# Patient Record
Sex: FEMALE | Race: WHITE | NOT HISPANIC OR LATINO | Employment: UNEMPLOYED | URBAN - METROPOLITAN AREA
[De-identification: names, ages, dates, MRNs, and addresses within clinical notes are randomized per-mention and may not be internally consistent; named-entity substitution may affect disease eponyms.]

---

## 2022-03-15 ENCOUNTER — HOSPITAL ENCOUNTER (EMERGENCY)
Facility: CLINIC | Age: 27
Discharge: HOME | End: 2022-03-15
Attending: EMERGENCY MEDICINE | Admitting: EMERGENCY MEDICINE
Payer: COMMERCIAL

## 2022-03-15 VITALS
TEMPERATURE: 98.6 F | SYSTOLIC BLOOD PRESSURE: 121 MMHG | WEIGHT: 112.88 LBS | HEART RATE: 102 BPM | OXYGEN SATURATION: 98 % | RESPIRATION RATE: 20 BRPM | DIASTOLIC BLOOD PRESSURE: 78 MMHG

## 2022-03-15 DIAGNOSIS — F50.9 EATING DISORDER, UNSPECIFIED TYPE: ICD-10-CM

## 2022-03-15 LAB
ALBUMIN SERPL-MCNC: 3.7 G/DL (ref 3.4–5)
ALBUMIN UR-MCNC: NEGATIVE MG/DL
ALP SERPL-CCNC: 56 U/L (ref 40–150)
ALT SERPL W P-5'-P-CCNC: 22 U/L (ref 0–50)
AMORPH CRY #/AREA URNS HPF: ABNORMAL /HPF
ANION GAP SERPL CALCULATED.3IONS-SCNC: 6 MMOL/L (ref 3–14)
APPEARANCE UR: ABNORMAL
AST SERPL W P-5'-P-CCNC: 22 U/L (ref 0–45)
BACTERIA #/AREA URNS HPF: ABNORMAL /HPF
BASOPHILS # BLD AUTO: 0.1 10E3/UL (ref 0–0.2)
BASOPHILS NFR BLD AUTO: 1 %
BILIRUB SERPL-MCNC: 0.4 MG/DL (ref 0.2–1.3)
BILIRUB UR QL STRIP: NEGATIVE
BUN SERPL-MCNC: 8 MG/DL (ref 7–30)
CALCIUM SERPL-MCNC: 9.3 MG/DL (ref 8.5–10.1)
CHLORIDE BLD-SCNC: 107 MMOL/L (ref 94–109)
CO2 SERPL-SCNC: 26 MMOL/L (ref 20–32)
COLOR UR AUTO: YELLOW
CREAT SERPL-MCNC: 0.69 MG/DL (ref 0.52–1.04)
EOSINOPHIL # BLD AUTO: 0.1 10E3/UL (ref 0–0.7)
EOSINOPHIL NFR BLD AUTO: 1 %
ERYTHROCYTE [DISTWIDTH] IN BLOOD BY AUTOMATED COUNT: 13.7 % (ref 10–15)
GFR SERPL CREATININE-BSD FRML MDRD: >90 ML/MIN/1.73M2
GLUCOSE BLD-MCNC: 90 MG/DL (ref 70–99)
GLUCOSE UR STRIP-MCNC: NEGATIVE MG/DL
HCG UR QL: NEGATIVE
HCT VFR BLD AUTO: 37.6 % (ref 35–47)
HGB BLD-MCNC: 12.1 G/DL (ref 11.7–15.7)
HGB UR QL STRIP: NEGATIVE
HOLD SPECIMEN: NORMAL
IMM GRANULOCYTES # BLD: 0 10E3/UL
IMM GRANULOCYTES NFR BLD: 0 %
KETONES UR STRIP-MCNC: NEGATIVE MG/DL
LEUKOCYTE ESTERASE UR QL STRIP: ABNORMAL
LIPASE SERPL-CCNC: 159 U/L (ref 73–393)
LYMPHOCYTES # BLD AUTO: 2.4 10E3/UL (ref 0.8–5.3)
LYMPHOCYTES NFR BLD AUTO: 31 %
MCH RBC QN AUTO: 28.8 PG (ref 26.5–33)
MCHC RBC AUTO-ENTMCNC: 32.2 G/DL (ref 31.5–36.5)
MCV RBC AUTO: 90 FL (ref 78–100)
MONOCYTES # BLD AUTO: 0.4 10E3/UL (ref 0–1.3)
MONOCYTES NFR BLD AUTO: 5 %
MUCOUS THREADS #/AREA URNS LPF: PRESENT /LPF
NEUTROPHILS # BLD AUTO: 4.8 10E3/UL (ref 1.6–8.3)
NEUTROPHILS NFR BLD AUTO: 62 %
NITRATE UR QL: NEGATIVE
NRBC # BLD AUTO: 0 10E3/UL
NRBC BLD AUTO-RTO: 0 /100
PH UR STRIP: 8 [PH] (ref 5–7)
PLATELET # BLD AUTO: 314 10E3/UL (ref 150–450)
POTASSIUM BLD-SCNC: 4.3 MMOL/L (ref 3.4–5.3)
PROT SERPL-MCNC: 8.1 G/DL (ref 6.8–8.8)
RBC # BLD AUTO: 4.2 10E6/UL (ref 3.8–5.2)
RBC URINE: 2 /HPF
SODIUM SERPL-SCNC: 139 MMOL/L (ref 133–144)
SP GR UR STRIP: 1.01 (ref 1–1.03)
SQUAMOUS EPITHELIAL: 2 /HPF
UROBILINOGEN UR STRIP-MCNC: NORMAL MG/DL
WBC # BLD AUTO: 7.7 10E3/UL (ref 4–11)
WBC URINE: 3 /HPF

## 2022-03-15 PROCEDURE — 83690 ASSAY OF LIPASE: CPT | Performed by: EMERGENCY MEDICINE

## 2022-03-15 PROCEDURE — 81025 URINE PREGNANCY TEST: CPT | Performed by: EMERGENCY MEDICINE

## 2022-03-15 PROCEDURE — 36415 COLL VENOUS BLD VENIPUNCTURE: CPT | Performed by: EMERGENCY MEDICINE

## 2022-03-15 PROCEDURE — 90791 PSYCH DIAGNOSTIC EVALUATION: CPT

## 2022-03-15 PROCEDURE — 82040 ASSAY OF SERUM ALBUMIN: CPT | Performed by: EMERGENCY MEDICINE

## 2022-03-15 PROCEDURE — 93005 ELECTROCARDIOGRAM TRACING: CPT

## 2022-03-15 PROCEDURE — 81001 URINALYSIS AUTO W/SCOPE: CPT | Performed by: EMERGENCY MEDICINE

## 2022-03-15 PROCEDURE — 258N000003 HC RX IP 258 OP 636: Performed by: EMERGENCY MEDICINE

## 2022-03-15 PROCEDURE — 80053 COMPREHEN METABOLIC PANEL: CPT | Performed by: EMERGENCY MEDICINE

## 2022-03-15 PROCEDURE — 85025 COMPLETE CBC W/AUTO DIFF WBC: CPT | Performed by: EMERGENCY MEDICINE

## 2022-03-15 PROCEDURE — 99285 EMERGENCY DEPT VISIT HI MDM: CPT | Mod: 25

## 2022-03-15 RX ORDER — SODIUM CHLORIDE 9 MG/ML
INJECTION, SOLUTION INTRAVENOUS CONTINUOUS
Status: DISCONTINUED | OUTPATIENT
Start: 2022-03-15 | End: 2022-03-15 | Stop reason: HOSPADM

## 2022-03-15 RX ADMIN — SODIUM CHLORIDE 1000 ML: 9 INJECTION, SOLUTION INTRAVENOUS at 17:18

## 2022-03-15 ASSESSMENT — ENCOUNTER SYMPTOMS
BLOOD IN STOOL: 0
ABDOMINAL PAIN: 0
UNEXPECTED WEIGHT CHANGE: 0
TROUBLE SWALLOWING: 0
COUGH: 0
POLYDIPSIA: 0
VOMITING: 0
PALPITATIONS: 0
FEVER: 0
HYPERACTIVE: 0
DIARRHEA: 0
CHEST TIGHTNESS: 0
WEAKNESS: 0
HALLUCINATIONS: 1
SHORTNESS OF BREATH: 0
DYSPHORIC MOOD: 1
NERVOUS/ANXIOUS: 0
CHILLS: 0
DECREASED CONCENTRATION: 0
BACK PAIN: 0
HEADACHES: 0
ACTIVITY CHANGE: 0
FATIGUE: 0
DYSURIA: 0
NAUSEA: 0
POLYPHAGIA: 0
SLEEP DISTURBANCE: 0
APPETITE CHANGE: 1

## 2022-03-15 NOTE — ED PROVIDER NOTES
History     Chief Complaint:  No chief complaint on file.       HPI   Anna Valdovinos is a 26 year old female who presents with hx of depression and body image issues with eating swings and induced vomiting.  Here because last 2 weeks she feels her thoughts about food have been out of control as well body image thoughts.  Denies SI HI AH VH.  No blood or bile.  No abdominal pain.      ROS:  Review of Systems   Constitutional: Positive for appetite change. Negative for activity change, chills, fatigue, fever and unexpected weight change.   HENT: Negative for trouble swallowing.    Respiratory: Negative for cough, chest tightness and shortness of breath.    Cardiovascular: Negative for chest pain, palpitations and leg swelling.   Gastrointestinal: Negative for abdominal pain, blood in stool, diarrhea, nausea and vomiting.   Endocrine: Negative for cold intolerance, heat intolerance, polydipsia, polyphagia and polyuria.   Genitourinary: Negative for dysuria.   Musculoskeletal: Negative for back pain.   Neurological: Negative for weakness and headaches.   Psychiatric/Behavioral: Positive for dysphoric mood and hallucinations. Negative for behavioral problems, decreased concentration, self-injury, sleep disturbance and suicidal ideas. The patient is not nervous/anxious and is not hyperactive.    All other systems reviewed and are negative.        Allergies:  Cefprozil  Latex     Medications:    No current outpatient medications on file.      Past Medical History:    No past medical history on file.  There is no problem list on file for this patient.       Past Surgical History:    No past surgical history on file.     Family History:    family history is not on file.    Social History:     PCP: No Ref-Primary, Physician     Physical Exam     Patient Vitals for the past 24 hrs:   BP Temp Temp src Pulse Resp SpO2 Weight   03/15/22 1622 -- -- -- -- -- -- 51.2 kg (112 lb 14 oz)   03/15/22 1618 133/80 -- -- -- -- -- --    03/15/22 1617 -- 98.6  F (37  C) Temporal 115 20 97 % --        Physical Exam  Constitutional: Patient is well appearing. No distress.  Head: Atraumatic.  Mouth/Throat: Oropharynx is clear and moist. No oropharyngeal exudate.  Eyes: Conjunctivae and EOM are normal. No scleral icterus.  Neck: Normal range of motion. Neck supple.   Cardiovascular: Normal rate, regular rhythm, normal heart sounds and intact distal pulses.   Pulmonary/Chest: Breath sounds normal. No respiratory distress.  Abdominal: Soft. Bowel sounds are normal. No distension. No tenderness. No rebound or guarding.   Musculoskeletal: Normal range of motion. No edema or tenderness.   Neurological: Alert and orientated to person, place, and time. No observable focal neuro deficit  Skin: Warm and dry. No rash noted. Not diaphoretic.     Emergency Department Course   ECG:        Laboratory:  Labs Ordered and Resulted from Time of ED Arrival to Time of ED Departure - No data to display     Procedures       Emergency Department Course:    Mental Health Risk Assessment      PSS-3    Date and Time Over the past 2 weeks have you felt down, depressed, or hopeless? Over the past 2 weeks have you had thoughts of killing yourself? Have you ever attempted to kill yourself? When did this last happen? User   03/15/22 1621 yes no yes more than 6 months ago EED                  Reviewed:  I reviewed nursing notes, vitals and past medical history    Assessments:   I obtained history and examined the patient as noted above.    I rechecked the patient and explained findings.       Consults:   DEC    Interventions:  Medications   0.9% sodium chloride BOLUS (1,000 mLs Intravenous New Bag 3/15/22 9642)     Followed by   sodium chloride 0.9% infusion (has no administration in time range)        Disposition:  The patient was discharged to home.     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  Mixed type sx  of eating disorder.  Here CBC CMP and lipase shows no metabolic or GI derangement.  DEC saw pt please see separate note.  Safe for dc and strict return and F/u given.          Diagnosis:    ICD-10-CM    1. Eating disorder, unspecified type  F50.9         Discharge Medications:  New Prescriptions    No medications on file        3/15/2022   Jonathan Mijares MD Stevens, Andrew C, MD  03/15/22 2107

## 2022-03-15 NOTE — ED PROVIDER NOTES
"  History   Chief Complaint:  No chief complaint on file.       HPI   Anna Valdovinos is a 26 year old female with history of *** who presents with ***.    Review of Systems  ***    Allergies:  Cefprozil  Latex  Cinnamon  Mushroom  Shellfix    Medications:  Fluoxetine  Vistaril   Epi-pen     Past Medical History:     Severe recurrent major depression  Anxiety     Past Surgical History:    The patient has no pertinent surgical history.     Family History:    The patient has no known family history.    Social History:  The patient presents to the ED ***. She has no assigned PCP.       Physical Exam     Patient Vitals for the past 24 hrs:   BP Temp Temp src Pulse Resp SpO2 Weight   03/15/22 1622 -- -- -- -- -- -- 51.2 kg (112 lb 14 oz)   03/15/22 1618 133/80 -- -- -- -- -- --   03/15/22 1617 -- 98.6  F (37  C) Temporal 115 20 97 % --       Physical Exam  ***    Emergency Department Course   ECG  ECG obtained at ***, ECG read at ***  ***   *** as compared to prior, dated ***/***/***.  Rate *** bpm. CO interval *** ms. QRS duration *** ms. QT/QTc *** ms. P-R-T axes *** *** ***.     Imaging:  No orders to display     Report per {read:625007}    Laboratory:  Labs Ordered and Resulted from Time of ED Arrival to Time of ED Departure - No data to display     Procedures  ***    Emergency Department Course:    Mental Health Risk Assessment      PSS-3    Date and Time Over the past 2 weeks have you felt down, depressed, or hopeless? Over the past 2 weeks have you had thoughts of killing yourself? Have you ever attempted to kill yourself? When did this last happen? User   03/15/22 1621 yes no yes more than 6 months ago EED          {TIP (no need to delete Tips)  Reminder - a positive PSS3 should have the \"suicide risk asessment completion\" dotphrase completed. Please add this and complete it if it not automatically listed. :[id number]}    {Suicide Assessment Completion:864901}    Reviewed:  I reviewed " "{EDreview:626742}    Assessments:  *** I obtained history and examined the patient as noted above.   *** I rechecked the patient*** and explained findings***.   ***    Consults:  ***    Interventions:  Medications   0.9% sodium chloride BOLUS (1,000 mLs Intravenous New Bag 3/15/22 1237)     Followed by   sodium chloride 0.9% infusion (has no administration in time range)       Disposition:  {EPPAFV Dispo:411453}    Impression & Plan     CMS Diagnoses: {Sepsis/Septic Shock/Stemi/Stroke:342045::\"None\"}  {FVTrauma:739357}    Medical Decision Making:  ***     Critical Care Time: was *** minutes for this patient excluding procedures    Diagnosis:  No diagnosis found.    Discharge Medications:  New Prescriptions    No medications on file       Scribe Disclosure:  I, Ivon Villeda, am serving as a scribe at 5:34 PM on 3/15/2022 to document services personally performed by Jonathan Mijares MD based on my observations and the provider's statements to me.           "

## 2022-03-15 NOTE — ED TRIAGE NOTES
Pt presents to ED with dizziness and nauseated. States she has been dealing with potential eating disorder. States many days she eats nothing. Other days its very little. Has rashaad dealing with issues with eating since high school. Has not been diagnosed with any eating disorders. Feels nauseated when she does eat. Thinks about trying to make herself vomit, but states she has only done so one time. Feels like skin is more pale than normal. C/o upper abdominal discomfort.

## 2022-03-15 NOTE — LETTER
Anna Valdovinos was seen and treated in our emergency department on 3/15/2022.  She may return to work on 03/16/22      If you have any questions or concerns, please don't hesitate to call.      Janette GERMAN RN

## 2022-03-16 LAB
ATRIAL RATE - MUSE: 88 BPM
DIASTOLIC BLOOD PRESSURE - MUSE: NORMAL MMHG
INTERPRETATION ECG - MUSE: NORMAL
P AXIS - MUSE: 76 DEGREES
PR INTERVAL - MUSE: 120 MS
QRS DURATION - MUSE: 72 MS
QT - MUSE: 338 MS
QTC - MUSE: 408 MS
R AXIS - MUSE: 64 DEGREES
SYSTOLIC BLOOD PRESSURE - MUSE: NORMAL MMHG
T AXIS - MUSE: 40 DEGREES
VENTRICULAR RATE- MUSE: 88 BPM

## 2022-03-16 NOTE — DISCHARGE INSTRUCTIONS
DEC Aftercare Plan  If I am feeling unsafe or I am in a crisis, I will:   Contact my established care providers   Call the National Suicide Prevention Lifeline: [phone]   Go to the nearest emergency room   Call 911     Warning signs that I or other people might notice when a crisis is developing for me:   Thoughts of physical self harm   Increased difficulty eating/maintaing appetite     Things I am able to do on my own to cope or help me feel better:   Follow daily routine for structure and accountability  Utilize distraction methods/grounding techinques     Things that I am able to do with others to cope or help me feel better:   Ask for help as needed  Spend time with trusted indvididual     Changes I can make to support my mental health and wellness:   Follow through with individual therapy  Consider [facility] case management for additional supports and resources     People in my life that I can ask for help:   [facility] crisis workers  Outpatient providers     Your [facility] has a mental health crisis team you can call 24/7: [facility]  [phone]        Additional resources and information:     *therapy appointment details; virtual appointment  Date: Thursday, 3/17/2022  Time: 9:00 am - 10:00 am  Provider: Henna Mendez  Location: [facility], [address] [place] [address]  Phone: [phone]  Type: Teletherapy  Patient Instructions  For all appointments: you will be sent information to fill out the intake paperwork online. Please fill the paperwork prior to your appointment. For telehealth appointments: you will also be sent a zoom link to attend the appointment remotely.      Other [facility] resources;  [facility]      [phone]     24/7 Crisis Response, [phone]     Adult Mental Health, [phone]     Aging and Disability, Services [phone]     Chemical Health, [phone]     Child Protection Reporting, [phone]     Children and Family Services Intake,  [phone]     [facility] Crisis Line, [phone]     Vulnerable Adult Reporting, [phone]       - coping skills for anxiety:  Stop and Breathe     When anxiety flares, take a time out and think about what it is that is making you so nervous. Anxiety is typically experienced as worrying about a future or past event.     For example, you may be worried that something bad is going to happen in the future. Perhaps you continually feel upset over an event that has already occurred. Regardless of what you are worried about, a big part of the problem is that you are not being mindful of the present moment.     Anxiety loses its  when you clear your mind of worry and bring your awareness back to the present.     The next time your anxiety starts to take you out of the present, regain control by sitting down and taking a few deep breaths. Simply stopping and breathing can help restore a sense of personal balance and bring you back to the present moment. However, if you have the time, try taking this activity a little further and experiment with a breathing exercise and mantra.          Figure Out What's Bothering You     The physical symptoms of panic and anxiety, such as trembling, chest pain, and rapid heartbeat, are usually more apparent than understanding just what is making you anxious. However, in order to get to the root of your anxiety, you need to figure out what s bothering you. To get to the bottom of your anxiety, put some time aside to exploring your thoughts and feelings.     Writing in a journal can be a great way to get in touch with your sources of anxiety. If anxious feelings seem to be keeping you up at night, try keeping a journal or notepad next to your bed. Write down all of the things that are bothering you. Talking with a friend can be another way to discover and understand your anxious feelings.1?     Make it a habit to regularly uncover and express your feelings of anxiety.           Focus On What You Can Change     Many times anxiety stems from fearing things that haven t even happened and may never occur. For example, even though everything is okay, you may still worry about potential issues, such as losing your job, becoming ill, or the safety of your loved ones.     Life can be unpredictable and no matter how hard you try, you can t always control what happens. However, you can decide how you are going to deal with the unknown. You can turn your anxiety into a source of strength by letting go of fear and focusing on gratitude.          Replace your fears by changing your attitude about them. For example, stop fearing to lose your job and instead focus on how grateful you are to have a job. Come to work determined to do your best. Instead of fearing your loved one's safety, spend time with them, or express your appreciation of them. With a little practice, you can learn to dump your anxiety and  a more positive outlook.     At times, your anxiety may actually be caused by a real circumstance in your life. Perhaps you re in a situation where it is realistic to be worried about losing your job due to high company layoffs or talks of downsizing.     When anxiety is identified as being caused by a current problem, then taking action may be the answer to reducing your anxiety.4? For example, you may need to start job searching or scheduling interviews after work.     By being more proactive, you can feel like you have a bit more control over your situation.          Focus on Something Less Anxiety-Provoking     At times, it may be most helpful to simply redirect yourself to focus on something other than your anxiety.6? You may want to reach out to others, do some work around your home, or engage in an enjoyable activity or hobby. Here are a few ideas of things you can do to thwart off anxiety:     Do some chores or organizing around the house.     Engage in a creative activity, such as  "drawing, painting, or writing.     Go for a walk or engage in some other form of physical exercise.     Listen to music.     [place] or meditate.     Read a good book or watch a funny movie.     Most people are familiar with experiencing some anxiety from time-to-time. However, chronic anxiety can be a sign of a diagnosable anxiety disorder.     When anxiety affects one s relationships, work performance, and other areas of life, there is potential that these anxious feelings are actually an indication of mental health illness.     If you are experiencing anxiety and panic symptoms, talk with your doctor or other professionals who treat panic disorder. They will be able to address any concerns you have, provide information on diagnosis, and discuss your treatment options.     Crisis Lines  Crisis Text Line  Text 169119  You will be connected with a trained live crisis counselor to provide support.    Por farida, texto  LIZ a 692895 o texto a 442-AYUDAME en WhatsApp    National Hope Line  1.800.SUICIDE [7015221]      Community Resources  Fast Tracker  Linking people to mental health and substance use disorder resources  fasttrackermn.org     [place] [facility] Warm Line  Peer to peer support  Monday thru Saturday, 12 pm to 10 pm  [phone] or [phone]  Text \"Support\" to 44783    National [place] [employer] Mental Illness (RUPA)  853.094.0306 or 1.888.RUPA.HELPS        Mental Health Apps  My3  https://my3225 filmspp.org/    VirtualHopeBox  https://Deluux.org/apps/virtual-hope-box/  "
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
## 2022-04-12 ENCOUNTER — APPOINTMENT (OUTPATIENT)
Dept: ULTRASOUND IMAGING | Facility: CLINIC | Age: 27
End: 2022-04-12
Attending: EMERGENCY MEDICINE
Payer: COMMERCIAL

## 2022-04-12 ENCOUNTER — HOSPITAL ENCOUNTER (EMERGENCY)
Facility: CLINIC | Age: 27
Discharge: HOME | End: 2022-04-12
Attending: EMERGENCY MEDICINE | Admitting: EMERGENCY MEDICINE
Payer: COMMERCIAL

## 2022-04-12 VITALS
WEIGHT: 119.05 LBS | OXYGEN SATURATION: 99 % | HEART RATE: 86 BPM | TEMPERATURE: 97.6 F | RESPIRATION RATE: 16 BRPM | SYSTOLIC BLOOD PRESSURE: 107 MMHG | DIASTOLIC BLOOD PRESSURE: 76 MMHG

## 2022-04-12 DIAGNOSIS — N93.9 VAGINAL BLEEDING: ICD-10-CM

## 2022-04-12 LAB
ALBUMIN UR-MCNC: 100 MG/DL
ANION GAP SERPL CALCULATED.3IONS-SCNC: 5 MMOL/L (ref 3–14)
APPEARANCE UR: ABNORMAL
BASOPHILS # BLD AUTO: 0.1 10E3/UL (ref 0–0.2)
BASOPHILS NFR BLD AUTO: 1 %
BILIRUB UR QL STRIP: NEGATIVE
BUN SERPL-MCNC: 10 MG/DL (ref 7–30)
CALCIUM SERPL-MCNC: 9.1 MG/DL (ref 8.5–10.1)
CHLORIDE BLD-SCNC: 107 MMOL/L (ref 94–109)
CO2 SERPL-SCNC: 25 MMOL/L (ref 20–32)
COLOR UR AUTO: ABNORMAL
CREAT SERPL-MCNC: 0.59 MG/DL (ref 0.52–1.04)
EOSINOPHIL # BLD AUTO: 0.1 10E3/UL (ref 0–0.7)
EOSINOPHIL NFR BLD AUTO: 1 %
ERYTHROCYTE [DISTWIDTH] IN BLOOD BY AUTOMATED COUNT: 13.6 % (ref 10–15)
GFR SERPL CREATININE-BSD FRML MDRD: >90 ML/MIN/1.73M2
GLUCOSE BLD-MCNC: 86 MG/DL (ref 70–99)
GLUCOSE UR STRIP-MCNC: NEGATIVE MG/DL
HCG SER QL IA.RAPID: NEGATIVE
HCG UR QL: NEGATIVE
HCT VFR BLD AUTO: 39.7 % (ref 35–47)
HGB BLD-MCNC: 12.6 G/DL (ref 11.7–15.7)
HGB UR QL STRIP: ABNORMAL
HOLD SPECIMEN: NORMAL
IMM GRANULOCYTES # BLD: 0 10E3/UL
IMM GRANULOCYTES NFR BLD: 0 %
KETONES UR STRIP-MCNC: NEGATIVE MG/DL
LEUKOCYTE ESTERASE UR QL STRIP: ABNORMAL
LYMPHOCYTES # BLD AUTO: 2.4 10E3/UL (ref 0.8–5.3)
LYMPHOCYTES NFR BLD AUTO: 38 %
MCH RBC QN AUTO: 28.1 PG (ref 26.5–33)
MCHC RBC AUTO-ENTMCNC: 31.7 G/DL (ref 31.5–36.5)
MCV RBC AUTO: 88 FL (ref 78–100)
MONOCYTES # BLD AUTO: 0.4 10E3/UL (ref 0–1.3)
MONOCYTES NFR BLD AUTO: 6 %
NEUTROPHILS # BLD AUTO: 3.4 10E3/UL (ref 1.6–8.3)
NEUTROPHILS NFR BLD AUTO: 54 %
NITRATE UR QL: NEGATIVE
NRBC # BLD AUTO: 0 10E3/UL
NRBC BLD AUTO-RTO: 0 /100
PH UR STRIP: 7.5 [PH] (ref 5–7)
PLATELET # BLD AUTO: 347 10E3/UL (ref 150–450)
POTASSIUM BLD-SCNC: 3.9 MMOL/L (ref 3.4–5.3)
RBC # BLD AUTO: 4.49 10E6/UL (ref 3.8–5.2)
RBC URINE: >182 /HPF
SODIUM SERPL-SCNC: 137 MMOL/L (ref 133–144)
SP GR UR STRIP: 1.01 (ref 1–1.03)
SQUAMOUS EPITHELIAL: 12 /HPF
UROBILINOGEN UR STRIP-MCNC: NORMAL MG/DL
WBC # BLD AUTO: 6.4 10E3/UL (ref 4–11)
WBC URINE: 55 /HPF

## 2022-04-12 PROCEDURE — 84702 CHORIONIC GONADOTROPIN TEST: CPT

## 2022-04-12 PROCEDURE — 258N000003 HC RX IP 258 OP 636: Performed by: EMERGENCY MEDICINE

## 2022-04-12 PROCEDURE — 80048 BASIC METABOLIC PNL TOTAL CA: CPT | Performed by: EMERGENCY MEDICINE

## 2022-04-12 PROCEDURE — 87086 URINE CULTURE/COLONY COUNT: CPT | Performed by: EMERGENCY MEDICINE

## 2022-04-12 PROCEDURE — 96360 HYDRATION IV INFUSION INIT: CPT

## 2022-04-12 PROCEDURE — 93976 VASCULAR STUDY: CPT

## 2022-04-12 PROCEDURE — 85025 COMPLETE CBC W/AUTO DIFF WBC: CPT | Performed by: EMERGENCY MEDICINE

## 2022-04-12 PROCEDURE — 81025 URINE PREGNANCY TEST: CPT | Performed by: EMERGENCY MEDICINE

## 2022-04-12 PROCEDURE — 36415 COLL VENOUS BLD VENIPUNCTURE: CPT | Performed by: EMERGENCY MEDICINE

## 2022-04-12 PROCEDURE — 99285 EMERGENCY DEPT VISIT HI MDM: CPT | Mod: 25

## 2022-04-12 PROCEDURE — 81003 URINALYSIS AUTO W/O SCOPE: CPT | Performed by: EMERGENCY MEDICINE

## 2022-04-12 RX ORDER — SODIUM CHLORIDE 9 MG/ML
INJECTION, SOLUTION INTRAVENOUS CONTINUOUS
Status: DISCONTINUED | OUTPATIENT
Start: 2022-04-12 | End: 2022-04-12 | Stop reason: HOSPADM

## 2022-04-12 RX ADMIN — SODIUM CHLORIDE 1000 ML: 9 INJECTION, SOLUTION INTRAVENOUS at 14:59

## 2022-04-12 ASSESSMENT — ENCOUNTER SYMPTOMS
FEVER: 0
ABDOMINAL PAIN: 1
VOMITING: 0
DYSURIA: 1
SORE THROAT: 0
FLANK PAIN: 0
NAUSEA: 0
HEADACHES: 0
RHINORRHEA: 0
CHILLS: 0
DIARRHEA: 0
COUGH: 0
BLOOD IN STOOL: 0
SHORTNESS OF BREATH: 0
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
## 2022-04-12 NOTE — ED TRIAGE NOTES
Pt presents for evaluation of heavy vaginal bleeding with clots. She started having spotting yesterday evening, but by 1000 today she noticed the bleeding had increased significantly.   States she's on her fifth pad since noon.  Takes birth control and states her last period was two weeks ago, so bleeding now would be very abnormal for her.

## 2022-04-13 NOTE — RESULT ENCOUNTER NOTE
[facility] Emergency Dept discharge antibiotic (if prescribed): None  No changes in treatment per [facility] ED Lab Result Urine culture protocol.

## 2022-04-14 LAB — BACTERIA UR CULT: NORMAL

## 2022-09-26 ENCOUNTER — OFFICE VISIT (OUTPATIENT)
Dept: URGENT CARE | Facility: URGENT CARE | Age: 27
End: 2022-09-26
Payer: OTHER MISCELLANEOUS

## 2022-09-26 VITALS
DIASTOLIC BLOOD PRESSURE: 75 MMHG | OXYGEN SATURATION: 99 % | WEIGHT: 115 LBS | HEART RATE: 85 BPM | TEMPERATURE: 98.5 F | SYSTOLIC BLOOD PRESSURE: 123 MMHG

## 2022-09-26 DIAGNOSIS — S06.0X0A CONCUSSION WITHOUT LOSS OF CONSCIOUSNESS, INITIAL ENCOUNTER: Primary | ICD-10-CM

## 2022-09-26 PROCEDURE — 99203 OFFICE O/P NEW LOW 30 MIN: CPT
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
## 2023-11-28 ENCOUNTER — APPOINTMENT (OUTPATIENT)
Dept: ULTRASOUND IMAGING | Facility: CLINIC | Age: 28
End: 2023-11-28
Attending: EMERGENCY MEDICINE
Payer: COMMERCIAL

## 2023-11-28 ENCOUNTER — HOSPITAL ENCOUNTER (EMERGENCY)
Facility: CLINIC | Age: 28
Discharge: HOME | End: 2023-11-28
Attending: EMERGENCY MEDICINE | Admitting: EMERGENCY MEDICINE
Payer: COMMERCIAL

## 2023-11-28 VITALS
HEIGHT: 61 IN | OXYGEN SATURATION: 100 % | DIASTOLIC BLOOD PRESSURE: 71 MMHG | SYSTOLIC BLOOD PRESSURE: 113 MMHG | RESPIRATION RATE: 18 BRPM | HEART RATE: 79 BPM | TEMPERATURE: 97.6 F | WEIGHT: 124.3 LBS | BODY MASS INDEX: 23.47 KG/M2

## 2023-11-28 DIAGNOSIS — R10.13 EPIGASTRIC PAIN: ICD-10-CM

## 2023-11-28 DIAGNOSIS — R11.2 NAUSEA AND VOMITING, UNSPECIFIED VOMITING TYPE: ICD-10-CM

## 2023-11-28 LAB
ALBUMIN SERPL BCG-MCNC: 4.4 G/DL (ref 3.5–5.2)
ALP SERPL-CCNC: 73 U/L (ref 40–150)
ALT SERPL W P-5'-P-CCNC: 11 U/L (ref 0–50)
ANION GAP SERPL CALCULATED.3IONS-SCNC: 13 MMOL/L (ref 7–15)
AST SERPL W P-5'-P-CCNC: 15 U/L (ref 0–45)
BASOPHILS # BLD AUTO: 0.1 10E3/UL (ref 0–0.2)
BASOPHILS NFR BLD AUTO: 1 %
BILIRUB SERPL-MCNC: <0.2 MG/DL
BUN SERPL-MCNC: 7.8 MG/DL (ref 6–20)
CALCIUM SERPL-MCNC: 9.3 MG/DL (ref 8.6–10)
CHLORIDE SERPL-SCNC: 105 MMOL/L (ref 98–107)
CREAT SERPL-MCNC: 0.62 MG/DL (ref 0.51–0.95)
DEPRECATED HCO3 PLAS-SCNC: 22 MMOL/L (ref 22–29)
EGFRCR SERPLBLD CKD-EPI 2021: >90 ML/MIN/1.73M2
EOSINOPHIL # BLD AUTO: 0.3 10E3/UL (ref 0–0.7)
EOSINOPHIL NFR BLD AUTO: 3 %
ERYTHROCYTE [DISTWIDTH] IN BLOOD BY AUTOMATED COUNT: 14.3 % (ref 10–15)
GLUCOSE SERPL-MCNC: 101 MG/DL (ref 70–99)
HCG SERPL QL: NEGATIVE
HCT VFR BLD AUTO: 39.4 % (ref 35–47)
HGB BLD-MCNC: 13 G/DL (ref 11.7–15.7)
HOLD SPECIMEN: NORMAL
IMM GRANULOCYTES # BLD: 0 10E3/UL
IMM GRANULOCYTES NFR BLD: 0 %
LIPASE SERPL-CCNC: 47 U/L (ref 13–60)
LYMPHOCYTES # BLD AUTO: 4 10E3/UL (ref 0.8–5.3)
LYMPHOCYTES NFR BLD AUTO: 42 %
MCH RBC QN AUTO: 28 PG (ref 26.5–33)
MCHC RBC AUTO-ENTMCNC: 33 G/DL (ref 31.5–36.5)
MCV RBC AUTO: 85 FL (ref 78–100)
MONOCYTES # BLD AUTO: 0.5 10E3/UL (ref 0–1.3)
MONOCYTES NFR BLD AUTO: 6 %
NEUTROPHILS # BLD AUTO: 4.6 10E3/UL (ref 1.6–8.3)
NEUTROPHILS NFR BLD AUTO: 48 %
NRBC # BLD AUTO: 0 10E3/UL
NRBC BLD AUTO-RTO: 0 /100
PLATELET # BLD AUTO: 365 10E3/UL (ref 150–450)
POTASSIUM SERPL-SCNC: 3.9 MMOL/L (ref 3.4–5.3)
PROT SERPL-MCNC: 8 G/DL (ref 6.4–8.3)
RBC # BLD AUTO: 4.64 10E6/UL (ref 3.8–5.2)
SODIUM SERPL-SCNC: 140 MMOL/L (ref 135–145)
WBC # BLD AUTO: 9.4 10E3/UL (ref 4–11)

## 2023-11-28 PROCEDURE — 96376 TX/PRO/DX INJ SAME DRUG ADON: CPT

## 2023-11-28 PROCEDURE — 85025 COMPLETE CBC W/AUTO DIFF WBC: CPT | Performed by: EMERGENCY MEDICINE

## 2023-11-28 PROCEDURE — 258N000003 HC RX IP 258 OP 636: Performed by: EMERGENCY MEDICINE

## 2023-11-28 PROCEDURE — 250N000011 HC RX IP 250 OP 636: Mod: JZ | Performed by: EMERGENCY MEDICINE

## 2023-11-28 PROCEDURE — 80053 COMPREHEN METABOLIC PANEL: CPT | Performed by: EMERGENCY MEDICINE

## 2023-11-28 PROCEDURE — 84703 CHORIONIC GONADOTROPIN ASSAY: CPT | Performed by: EMERGENCY MEDICINE

## 2023-11-28 PROCEDURE — 250N000013 HC RX MED GY IP 250 OP 250 PS 637: Performed by: EMERGENCY MEDICINE

## 2023-11-28 PROCEDURE — 76705 ECHO EXAM OF ABDOMEN: CPT

## 2023-11-28 PROCEDURE — 36415 COLL VENOUS BLD VENIPUNCTURE: CPT | Performed by: EMERGENCY MEDICINE

## 2023-11-28 PROCEDURE — 99285 EMERGENCY DEPT VISIT HI MDM: CPT | Mod: 25

## 2023-11-28 PROCEDURE — 96374 THER/PROPH/DIAG INJ IV PUSH: CPT

## 2023-11-28 PROCEDURE — 83690 ASSAY OF LIPASE: CPT | Performed by: EMERGENCY MEDICINE

## 2023-11-28 PROCEDURE — 96375 TX/PRO/DX INJ NEW DRUG ADDON: CPT

## 2023-11-28 PROCEDURE — 96361 HYDRATE IV INFUSION ADD-ON: CPT

## 2023-11-28 RX ORDER — ONDANSETRON 2 MG/ML
4 INJECTION INTRAMUSCULAR; INTRAVENOUS ONCE
Status: COMPLETED | OUTPATIENT
Start: 2023-11-28 | End: 2023-11-28

## 2023-11-28 RX ORDER — KETOROLAC TROMETHAMINE 15 MG/ML
15 INJECTION, SOLUTION INTRAMUSCULAR; INTRAVENOUS ONCE
Status: COMPLETED | OUTPATIENT
Start: 2023-11-28 | End: 2023-11-28

## 2023-11-28 RX ORDER — ONDANSETRON 4 MG/1
4 TABLET, ORALLY DISINTEGRATING ORAL EVERY 8 HOURS PRN
Qty: 10 TABLET | Refills: 0 | Status: SHIPPED | OUTPATIENT
Start: 2023-11-28 | End: 2023-12-01

## 2023-11-28 RX ORDER — MAGNESIUM HYDROXIDE/ALUMINUM HYDROXICE/SIMETHICONE 120; 1200; 1200 MG/30ML; MG/30ML; MG/30ML
15 SUSPENSION ORAL ONCE
Status: COMPLETED | OUTPATIENT
Start: 2023-11-28 | End: 2023-11-28

## 2023-11-28 RX ORDER — HYDROCODONE BITARTRATE AND ACETAMINOPHEN 5; 325 MG/1; MG/1
1 TABLET ORAL ONCE
Status: COMPLETED | OUTPATIENT
Start: 2023-11-28 | End: 2023-11-28

## 2023-11-28 RX ADMIN — ONDANSETRON 4 MG: 2 INJECTION INTRAMUSCULAR; INTRAVENOUS at 06:04

## 2023-11-28 RX ADMIN — HYDROCODONE BITARTRATE AND ACETAMINOPHEN 1 TABLET: 5; 325 TABLET ORAL at 07:49

## 2023-11-28 RX ADMIN — ONDANSETRON 4 MG: 2 INJECTION INTRAMUSCULAR; INTRAVENOUS at 00:39

## 2023-11-28 RX ADMIN — SODIUM CHLORIDE 1000 ML: 9 INJECTION, SOLUTION INTRAVENOUS at 00:37

## 2023-11-28 RX ADMIN — ALUMINUM HYDROXIDE, MAGNESIUM HYDROXIDE, AND SIMETHICONE 15 ML: 200; 200; 20 SUSPENSION ORAL at 06:16

## 2023-11-28 RX ADMIN — KETOROLAC TROMETHAMINE 15 MG: 15 INJECTION, SOLUTION INTRAMUSCULAR; INTRAVENOUS at 02:29

## 2023-11-28 ASSESSMENT — ACTIVITIES OF DAILY LIVING (ADL)
ADLS_ACUITY_SCORE: 35

## 2023-11-28 NOTE — LETTER
November 28, 2023      To Whom It May Concern:      Anna Valdovinos was seen in our Emergency Department today, 11/28/23.  I expect her condition to improve over the next 1-2 days.  She may return to work/school when improved.    Sincerely,        JESUS Kelley

## 2023-11-28 NOTE — ED PROVIDER NOTES
"  History     Chief Complaint:  Abdominal Pain       The history is provided by the patient.      Anna Valdovinos is a 27 year old female who presents with RUQ abdominal pain beginning at 3 pm and coming in waves since. She states \"it feels like my stomach is being torn to shreds.\" Pain radiates into her right lower back. She reports nausea and diarrhea x20 since pain began. Reports some dry heaving but no vomiting. Patient reports she has had similar pain periodically for some time now, roughly twice a year. Pain lasts a few days and resolves on its own. Pain does not typically align with her menstrual period, though she does note she is due for her period today. She has been seen in the emergency department for this issue in the past and workup has always been negative. Patient did not take any medications for pain at home because she worried she would vomit them up. She took her birth control and allergy medications normally this morning. No history of abdominal surgery including cholecystectomy.     Independent Historian:   None - Patient Only    Review of External Notes:   Reviewed ED notes from 6/3/23, 10/2/23, 10/29/23, and 11/02/23.    Medications:    Epipen  Cymbalta  Minipress  Albuterol inhaler    Past Medical History:    Allergic rhinitis  Asthma   Depression   Anxiety   OCD    Past Surgical History:    Tympanostomy  [place] teeth extraction      Physical Exam   Patient Vitals for the past 24 hrs:   BP Temp Temp src Pulse Resp SpO2 Height Weight   11/28/23 0303 -- -- -- -- -- 100 % -- --   11/28/23 0232 -- -- -- -- -- 100 % -- --   11/28/23 0231 121/80 -- -- 75 -- -- -- --   11/28/23 0022 (!) 144/83 97.6  F (36.4  C) Temporal 91 18 98 % 1.543 m (5' 0.75\") 56.4 kg (124 lb 4.8 oz)        Physical Exam  General: Resting on the gurney, appears generally uncomfortable  Head:  The scalp, face, and head appear normal  Mouth/Throat: Mucus membranes are moist  CV:  Regular rate    Normal S1 and S2  No " pathological murmur   Resp:  Breath sounds clear and equal bilaterally    Non-labored, no retractions or accessory muscle use    No coarseness    No wheezing   GI:  Abdomen is soft, no rigidity  RUQ tenderness to palpation. No guarding no rebound.  MS:  Normal motor assessment of all extremities.    Good capillary refill noted.  Skin:  No rash or lesions noted.  Neuro:   Speech is normal and fluent. No apparent deficit.  Psych: Awake. Alert.  Normal affect.      Appropriate interactions.    Emergency Department Course   Imaging:  Abdomen US, limited (RUQ only)   Final Result   IMPRESSION:   1.  No sonographic findings to explain patient's symptoms of right upper quadrant abdominal pain.         Report per radiology    Laboratory:  Labs Ordered and Resulted from Time of ED Arrival to Time of ED Departure   COMPREHENSIVE METABOLIC PANEL - Abnormal       Result Value    Sodium 140      Potassium 3.9      Carbon Dioxide (CO2) 22      Anion Gap 13      Urea Nitrogen 7.8      Creatinine 0.62      GFR Estimate >90      Calcium 9.3      Chloride 105      Glucose 101 (*)     Alkaline Phosphatase 73      AST 15      ALT 11      Protein Total 8.0      Albumin 4.4      Bilirubin Total <0.2     HCG QUALITATIVE PREGNANCY - Normal    hCG Serum Qualitative Negative     LIPASE - Normal    Lipase 47     CBC WITH PLATELETS AND DIFFERENTIAL    WBC Count 9.4      RBC Count 4.64      Hemoglobin 13.0      Hematocrit 39.4      MCV 85      MCH 28.0      MCHC 33.0      RDW 14.3      Platelet Count 365      % Neutrophils 48      % Lymphocytes 42      % Monocytes 6      % Eosinophils 3      % Basophils 1      % Immature Granulocytes 0      NRBCs per 100 WBC 0      Absolute Neutrophils 4.6      Absolute Lymphocytes 4.0      Absolute Monocytes 0.5      Absolute Eosinophils 0.3      Absolute Basophils 0.1      Absolute Immature Granulocytes 0.0      Absolute NRBCs 0.0        Emergency Department Course & Assessments:        Interventions:  Medications   ondansetron (ZOFRAN) injection 4 mg (4 mg Intravenous $Given 11/28/23 0039)   sodium chloride 0.9% BOLUS 1,000 mL (0 mLs Intravenous Stopped 11/28/23 0332)   ketorolac (TORADOL) injection 15 mg (15 mg Intravenous $Given 11/28/23 0229)   ondansetron (ZOFRAN) injection 4 mg (4 mg Intravenous $Given 11/28/23 0604)        Independent Interpretation (X-rays, CTs, rhythm strip):  None    Assessments/Consultations/Discussion of Management or Tests:  ED Course as of 11/28/23 0607   Tue Nov 28, 2023   0206 I obtained the history and examined the patient as noted above.        Social Determinants of Health affecting care:   None    Disposition:  The patient was discharged to home.     Impression & Plan    Medical Decision Making:  Anna Valdovinos is a 27 year old female who presents with abdominal pain and diarrhea.  She looks overall well and without a concerning etiology of abdominal pain.  A broad differential diagnosis was of course considered.   The workup in the ED is at this point negative.  No etiology for the patients pain is found at this point and my suspicion of an intraabdominal catastrophe or other worrisome etiology is very low.  I considered obtaining CT imaging, however, she has of multiple similar episodes in the past without etiology found.  Additionally primary complaint is cramping and diarrhea as well as nausea and vomiting.  In the setting of vomiting and diarrhea acute surgical pathology is somewhat less likely.  She has no focal abdominal tenderness other than mild right upper quadrant tenderness.  No guarding.  No rebound.  Again this argues against surgical pathology.  US and lab work are reassuring.    I will not therefore admit her for serial exams and further workup.  Patient is hemodynamically stable in ED. Plan is home with abdominal pain recheck by primary care physician or return to ED at anytime.  As this has been ongoing, I recommended she follow-up with a  gastroenterologist.  Contact information for Dr. Lora was provided.  Return for fevers greater than 102, increasing pain, other new symptoms develop.  Abdominal pain handout given.  Questions were answered.     Diagnosis:    ICD-10-CM    1. Epigastric pain  R10.13       2. Nausea and vomiting, unspecified vomiting type  R11.2            Discharge Medications:  Discharge Medication List as of 11/28/2023  7:42 AM        START taking these medications    Details   omeprazole (PRILOSEC) 20 MG DR capsule Take 1 capsule (20 mg) by mouth daily for 30 days, Disp-30 capsule, R-0, E-Prescribe      ondansetron (ZOFRAN ODT) 4 MG ODT tab Take 1 tablet (4 mg) by mouth every 8 hours as needed for nausea, Disp-10 tablet, R-0, E-Prescribe            Scribe Disclosure:  I, Britney Bourne, am serving as a scribe at 2:01 AM on 11/28/2023 to document services personally performed by Jennifer Sandhu MD based on my observations and the provider's statements to me.     11/28/2023   Jennifer Sandhu MD Taxman, Karah M, MD  11/28/23 3876

## 2023-11-28 NOTE — ED TRIAGE NOTES
Pt reports onset of generalized abdominal pain that began about 3pm today and is getting worse. Reports 1 episode of vomiting and approx 10 loose stools.      Triage Assessment (Adult)       Row Name 11/28/23 0023          Triage Assessment    Airway WDL WDL        Respiratory WDL    Respiratory WDL WDL        Skin Circulation/Temperature WDL    Skin Circulation/Temperature WDL WDL        Cardiac WDL    Cardiac WDL WDL        Peripheral/Neurovascular WDL    Peripheral Neurovascular WDL WDL        Cognitive/Neuro/Behavioral WDL    Cognitive/Neuro/Behavioral WDL WDL

## 2023-11-28 NOTE — ED NOTES
Pt received education regarding controlled medication administration in emergency department. Pt verbalizes understanding that they are unable to drive or operate heavy machinery after receiving any controlled medications. Pt's SO driving pt home.

## 2024-02-18 ENCOUNTER — APPOINTMENT (OUTPATIENT)
Dept: CT IMAGING | Facility: CLINIC | Age: 29
End: 2024-02-18
Attending: EMERGENCY MEDICINE
Payer: COMMERCIAL

## 2024-02-18 ENCOUNTER — HOSPITAL ENCOUNTER (EMERGENCY)
Facility: CLINIC | Age: 29
Discharge: HOME | End: 2024-02-18
Attending: EMERGENCY MEDICINE | Admitting: EMERGENCY MEDICINE
Payer: COMMERCIAL

## 2024-02-18 VITALS
SYSTOLIC BLOOD PRESSURE: 123 MMHG | HEART RATE: 87 BPM | OXYGEN SATURATION: 98 % | BODY MASS INDEX: 23.03 KG/M2 | DIASTOLIC BLOOD PRESSURE: 80 MMHG | TEMPERATURE: 98.6 F | HEIGHT: 61 IN | RESPIRATION RATE: 18 BRPM | WEIGHT: 122 LBS

## 2024-02-18 DIAGNOSIS — R19.7 NAUSEA VOMITING AND DIARRHEA: ICD-10-CM

## 2024-02-18 DIAGNOSIS — R11.2 NAUSEA VOMITING AND DIARRHEA: ICD-10-CM

## 2024-02-18 DIAGNOSIS — R10.30 LOWER ABDOMINAL PAIN: ICD-10-CM

## 2024-02-18 LAB
ALBUMIN SERPL BCG-MCNC: 3.9 G/DL (ref 3.5–5.2)
ALBUMIN UR-MCNC: NEGATIVE MG/DL
ALP SERPL-CCNC: 67 U/L (ref 40–150)
ALT SERPL W P-5'-P-CCNC: 9 U/L (ref 0–50)
AMORPH CRY #/AREA URNS HPF: ABNORMAL /HPF
ANION GAP SERPL CALCULATED.3IONS-SCNC: 12 MMOL/L (ref 7–15)
APPEARANCE UR: CLEAR
AST SERPL W P-5'-P-CCNC: 18 U/L (ref 0–45)
BACTERIA #/AREA URNS HPF: ABNORMAL /HPF
BASOPHILS # BLD AUTO: 0 10E3/UL (ref 0–0.2)
BASOPHILS NFR BLD AUTO: 0 %
BILIRUB SERPL-MCNC: 1 MG/DL
BILIRUB UR QL STRIP: NEGATIVE
BUN SERPL-MCNC: 8.4 MG/DL (ref 6–20)
CALCIUM SERPL-MCNC: 8.5 MG/DL (ref 8.6–10)
CHLORIDE SERPL-SCNC: 105 MMOL/L (ref 98–107)
COLOR UR AUTO: ABNORMAL
CREAT SERPL-MCNC: 0.62 MG/DL (ref 0.51–0.95)
DEPRECATED HCO3 PLAS-SCNC: 24 MMOL/L (ref 22–29)
EGFRCR SERPLBLD CKD-EPI 2021: >90 ML/MIN/1.73M2
EOSINOPHIL # BLD AUTO: 0.5 10E3/UL (ref 0–0.7)
EOSINOPHIL NFR BLD AUTO: 4 %
ERYTHROCYTE [DISTWIDTH] IN BLOOD BY AUTOMATED COUNT: 13.5 % (ref 10–15)
GLUCOSE SERPL-MCNC: 103 MG/DL (ref 70–99)
GLUCOSE UR STRIP-MCNC: NEGATIVE MG/DL
HCG SERPL QL: NEGATIVE
HCT VFR BLD AUTO: 39.7 % (ref 35–47)
HGB BLD-MCNC: 12.9 G/DL (ref 11.7–15.7)
HGB UR QL STRIP: ABNORMAL
IMM GRANULOCYTES # BLD: 0.1 10E3/UL
IMM GRANULOCYTES NFR BLD: 0 %
KETONES UR STRIP-MCNC: NEGATIVE MG/DL
LEUKOCYTE ESTERASE UR QL STRIP: NEGATIVE
LIPASE SERPL-CCNC: 33 U/L (ref 13–60)
LYMPHOCYTES # BLD AUTO: 2.7 10E3/UL (ref 0.8–5.3)
LYMPHOCYTES NFR BLD AUTO: 19 %
MCH RBC QN AUTO: 27.8 PG (ref 26.5–33)
MCHC RBC AUTO-ENTMCNC: 32.5 G/DL (ref 31.5–36.5)
MCV RBC AUTO: 86 FL (ref 78–100)
MONOCYTES # BLD AUTO: 0.7 10E3/UL (ref 0–1.3)
MONOCYTES NFR BLD AUTO: 5 %
MUCOUS THREADS #/AREA URNS LPF: PRESENT /LPF
NEUTROPHILS # BLD AUTO: 10.5 10E3/UL (ref 1.6–8.3)
NEUTROPHILS NFR BLD AUTO: 72 %
NITRATE UR QL: NEGATIVE
NRBC # BLD AUTO: 0 10E3/UL
NRBC BLD AUTO-RTO: 0 /100
PH UR STRIP: 7.5 [PH] (ref 5–7)
PLATELET # BLD AUTO: 367 10E3/UL (ref 150–450)
POTASSIUM SERPL-SCNC: 3.7 MMOL/L (ref 3.4–5.3)
PROT SERPL-MCNC: 7.2 G/DL (ref 6.4–8.3)
RBC # BLD AUTO: 4.64 10E6/UL (ref 3.8–5.2)
RBC URINE: 3 /HPF
SODIUM SERPL-SCNC: 141 MMOL/L (ref 135–145)
SP GR UR STRIP: 1.01 (ref 1–1.03)
SQUAMOUS EPITHELIAL: 3 /HPF
UROBILINOGEN UR STRIP-MCNC: NORMAL MG/DL
WBC # BLD AUTO: 14.4 10E3/UL (ref 4–11)
WBC URINE: 3 /HPF

## 2024-02-18 PROCEDURE — 36415 COLL VENOUS BLD VENIPUNCTURE: CPT | Performed by: EMERGENCY MEDICINE

## 2024-02-18 PROCEDURE — 99285 EMERGENCY DEPT VISIT HI MDM: CPT | Mod: 25

## 2024-02-18 PROCEDURE — 250N000011 HC RX IP 250 OP 636: Performed by: EMERGENCY MEDICINE

## 2024-02-18 PROCEDURE — 74177 CT ABD & PELVIS W/CONTRAST: CPT

## 2024-02-18 PROCEDURE — 258N000003 HC RX IP 258 OP 636: Performed by: EMERGENCY MEDICINE

## 2024-02-18 PROCEDURE — 84703 CHORIONIC GONADOTROPIN ASSAY: CPT | Performed by: EMERGENCY MEDICINE

## 2024-02-18 PROCEDURE — 96361 HYDRATE IV INFUSION ADD-ON: CPT

## 2024-02-18 PROCEDURE — 96375 TX/PRO/DX INJ NEW DRUG ADDON: CPT

## 2024-02-18 PROCEDURE — 96374 THER/PROPH/DIAG INJ IV PUSH: CPT | Mod: 59

## 2024-02-18 PROCEDURE — 250N000009 HC RX 250: Performed by: EMERGENCY MEDICINE

## 2024-02-18 PROCEDURE — 80053 COMPREHEN METABOLIC PANEL: CPT | Performed by: EMERGENCY MEDICINE

## 2024-02-18 PROCEDURE — 83690 ASSAY OF LIPASE: CPT | Performed by: EMERGENCY MEDICINE

## 2024-02-18 PROCEDURE — 81001 URINALYSIS AUTO W/SCOPE: CPT | Performed by: EMERGENCY MEDICINE

## 2024-02-18 PROCEDURE — 85025 COMPLETE CBC W/AUTO DIFF WBC: CPT | Performed by: EMERGENCY MEDICINE

## 2024-02-18 RX ORDER — ONDANSETRON 4 MG/1
4 TABLET, ORALLY DISINTEGRATING ORAL EVERY 6 HOURS PRN
Qty: 10 TABLET | Refills: 0 | Status: SHIPPED | OUTPATIENT
Start: 2024-02-18 | End: 2024-08-18

## 2024-02-18 RX ORDER — HYDROMORPHONE HYDROCHLORIDE 1 MG/ML
0.5 INJECTION, SOLUTION INTRAMUSCULAR; INTRAVENOUS; SUBCUTANEOUS ONCE
Status: COMPLETED | OUTPATIENT
Start: 2024-02-18 | End: 2024-02-18

## 2024-02-18 RX ORDER — ONDANSETRON 2 MG/ML
4 INJECTION INTRAMUSCULAR; INTRAVENOUS EVERY 30 MIN PRN
Status: DISCONTINUED | OUTPATIENT
Start: 2024-02-18 | End: 2024-02-18 | Stop reason: HOSPADM

## 2024-02-18 RX ORDER — IOPAMIDOL 755 MG/ML
61 INJECTION, SOLUTION INTRAVASCULAR ONCE
Status: COMPLETED | OUTPATIENT
Start: 2024-02-18 | End: 2024-02-18

## 2024-02-18 RX ORDER — KETOROLAC TROMETHAMINE 15 MG/ML
15 INJECTION, SOLUTION INTRAMUSCULAR; INTRAVENOUS ONCE
Status: COMPLETED | OUTPATIENT
Start: 2024-02-18 | End: 2024-02-18

## 2024-02-18 RX ADMIN — ONDANSETRON 4 MG: 2 INJECTION INTRAMUSCULAR; INTRAVENOUS at 07:10

## 2024-02-18 RX ADMIN — HYDROMORPHONE HYDROCHLORIDE 0.5 MG: 1 INJECTION, SOLUTION INTRAMUSCULAR; INTRAVENOUS; SUBCUTANEOUS at 10:15

## 2024-02-18 RX ADMIN — SODIUM CHLORIDE 1000 ML: 9 INJECTION, SOLUTION INTRAVENOUS at 07:05

## 2024-02-18 RX ADMIN — KETOROLAC TROMETHAMINE 15 MG: 15 INJECTION, SOLUTION INTRAMUSCULAR; INTRAVENOUS at 07:09

## 2024-02-18 RX ADMIN — SODIUM CHLORIDE 60 ML: 9 INJECTION, SOLUTION INTRAVENOUS at 11:18

## 2024-02-18 RX ADMIN — IOPAMIDOL 61 ML: 755 INJECTION, SOLUTION INTRAVENOUS at 11:18

## 2024-02-18 ASSESSMENT — ACTIVITIES OF DAILY LIVING (ADL)
ADLS_ACUITY_SCORE: 35

## 2024-02-18 NOTE — ED NOTES
Patient is tearful and crying in pain.  Pain medications ordered one time. Tons of pain in the right lower quad, patient still has her appendix.

## 2024-02-18 NOTE — DISCHARGE INSTRUCTIONS
Return to the emergency department or seek medical care as instructed if your symptoms fail to improve or significantly worsen.    Take Acetaminophen (aka Tylenol) and/or ibuprofen as needed for symptom/pain relief; use as directed.    Take zofran as need for nausea; use as directed.    Take Imodium as needed for diarrhea    Follow-up as indicated on page 1.  Maintain adequate hydration and get plenty of rest.

## 2024-02-18 NOTE — ED PROVIDER NOTES
"  History   Chief Complaint:  Abdominal Pain and Nausea, Vomiting, & Diarrhea     HPI   Anna Valdovinos is a 28 year old female who presents with abdominal pain, nausea, vomiting, and diarrhea. The patient started to have abdominal pain 5 days ago. Her period started 5 days ago. She denies any period related abnormality with timing, length, or flow. The abdominal pain is severe and constant. She describes lower bilateral quadrant abdominal pain. She has had 5-6 episodes of vomiting since Tuesday (5 days ago). She is having about 8-9 episodes of diarrhea daily (nonbloody). She denies any urinary symptoms.  No significant cough, chest pain, or shortness of breath.  She denies fever. She is sexually active, but denies STD or pregnancy concern. She denies recent antibiotic use, recent travel or sick contacts. She was given Zofran 4 mg with EMS and fentanyl 50 mcg. She rates the pain to be a 7.5/10 currently.    Independent Historian:    None    Review of External Notes:  N/A    Medications:    No known medications    Past Medical History:    Endometriosis   Heart murmur  Asthma    Past Surgical History:    Ear tube placement    Physical Exam   Patient Vitals for the past 24 hrs:   BP Temp Temp src Pulse Resp SpO2 Height Weight   02/18/24 1404 123/80 -- -- 87 -- 98 % -- --   02/18/24 0900 -- -- -- -- -- 98 % -- --   02/18/24 0738 -- -- -- -- -- 98 % -- --   02/18/24 0700 -- -- -- -- -- 99 % -- --   02/18/24 0624 118/78 -- -- 79 18 -- 1.549 m (5' 1\") 55.3 kg (122 lb)   02/18/24 0610 -- 98.6  F (37  C) Oral -- 16 100 % -- --   02/18/24 0606 123/78 -- -- 80 -- -- -- --   Physical Exam    General: Alert and cooperative with exam. Patient in mild distress. Normal mentation.  Head:  Scalp is NC/AT  Eyes:  No scleral icterus, PERRL  ENT:  The external nose and ears are normal. The oropharynx is normal and without erythema; mucus membranes are moist. Uvula midline, no evidence of deep space infection.  Neck:  Normal range of " motion without rigidity.  CV:  Regular rate and rhythm    No pathologic murmur   Resp:  Breath sounds are clear bilaterally    Non-labored, no retractions or accessory muscle use  GI:  Abdomen is soft, no distension. Mild diffuse tenderness, greatest to the lower quadrants. No peritoneal signs  MS:  No lower extremity edema   Skin:  Warm and dry, No rash or lesions noted.  Neuro: Oriented x 3. No gross motor deficits.  Emergency Department Course   Laboratory:  Labs Ordered and Resulted from Time of ED Arrival to Time of ED Departure   COMPREHENSIVE METABOLIC PANEL - Abnormal       Result Value    Sodium 141      Potassium 3.7      Carbon Dioxide (CO2) 24      Anion Gap 12      Urea Nitrogen 8.4      Creatinine 0.62      GFR Estimate >90      Calcium 8.5 (*)     Chloride 105      Glucose 103 (*)     Alkaline Phosphatase 67      AST 18      ALT 9      Protein Total 7.2      Albumin 3.9      Bilirubin Total 1.0     ROUTINE UA WITH MICROSCOPIC - Abnormal    Color Urine Straw      Appearance Urine Clear      Glucose Urine Negative      Bilirubin Urine Negative      Ketones Urine Negative      Specific Gravity Urine 1.009      Blood Urine Large (*)     pH Urine 7.5 (*)     Protein Albumin Urine Negative      Urobilinogen Urine Normal      Nitrite Urine Negative      Leukocyte Esterase Urine Negative      Bacteria Urine Few (*)     Mucus Urine Present (*)     Amorphous Crystals Urine Few (*)     RBC Urine 3 (*)     WBC Urine 3      Squamous Epithelials Urine 3 (*)    CBC WITH PLATELETS AND DIFFERENTIAL - Abnormal    WBC Count 14.4 (*)     RBC Count 4.64      Hemoglobin 12.9      Hematocrit 39.7      MCV 86      MCH 27.8      MCHC 32.5      RDW 13.5      Platelet Count 367      % Neutrophils 72      % Lymphocytes 19      % Monocytes 5      % Eosinophils 4      % Basophils 0      % Immature Granulocytes 0      NRBCs per 100 WBC 0      Absolute Neutrophils 10.5 (*)     Absolute Lymphocytes 2.7      Absolute Monocytes 0.7       Absolute Eosinophils 0.5      Absolute Basophils 0.0      Absolute Immature Granulocytes 0.1      Absolute NRBCs 0.0     LIPASE - Normal    Lipase 33     HCG QUALITATIVE PREGNANCY - Normal    hCG Serum Qualitative Negative        Emergency Department Course & Assessments:    Interventions:  Medications   sodium chloride 0.9% BOLUS 1,000 mL (0 mLs Intravenous Stopped 2/18/24 0828)   ketorolac (TORADOL) injection 15 mg (15 mg Intravenous $Given 2/18/24 0709)   HYDROmorphone (PF) (DILAUDID) injection 0.5 mg (0.5 mg Intravenous $Given 2/18/24 1015)   iopamidol (ISOVUE-370) solution 61 mL (61 mLs Intravenous $Given 2/18/24 1118)   Saline (60 mLs As instructed $Given 2/18/24 1118)      Assessments:  0642 I obtained history and examined patient.   1046 I rechecked the patient and explained findings.  1355 I rechecked the patient.    Independent Interpretation (X-rays, CTs, rhythm strip):  None    Consultations/Discussion of Management or Tests:  None       Social Determinants of Health affecting care:  None     Disposition:  The patient was discharged to home.   Impression & Plan    Medical Decision Making:    Anna Valdovinos is a 28-year-old female who presents with abdominal pain.  The differential diagnosis is broad and includes:  Appendicitis, cholecystitis, peptic ulcer disease, diverticulitis, bowel obstruction, ischemia, pancreatitis, ovarian pathology, endometriosis, PID, amongst others.  Based on clinical exam, laboratory testing, and imaging, no significant etiologies were found.  Patient's pregnancy test is negative.  CT abdomen pelvis without acute findings.  Low clinical suspicion for ovarian torsion, PID, or other emergent etiology to patient's pain.  She has noted similar pain in the past related to menstrual cycles/endometriosis; symptoms may also possibly be related to viral syndrome.  Patient notes significant improvement in pain with interventions as above.  Recommended continued supportive care.   Patient tolerating oral intake in the ED and did not have any significant episodes of vomiting or diarrhea.  Recommended continuous Imodium for diarrhea as needed.  No indication for stool studies.  Provided prescription for Zofran.  Close follow-up with PCP if symptoms not improving return precautions discussed.  Patient discharged home.    Diagnosis:    ICD-10-CM    1. Lower abdominal pain  R10.30       2. Nausea vomiting and diarrhea  R11.2     R19.7            Discharge Medications:  Discharge Medication List as of 2/18/2024  2:03 PM        START taking these medications    Details   ondansetron (ZOFRAN ODT) 4 MG ODT tab Take 1 tablet (4 mg) by mouth every 6 hours as needed for nausea or vomiting, Disp-10 tablet, R-0, E-Prescribe            Scribe Disclosure:  Antionette BERRY, am serving as a scribe at 6:49 AM on 2/18/2024 to document services personally performed by Ivan Fountain DO based on my observations and the provider's statements to me.  2/18/2024   Ivan Fountain DO O'Neill, Christopher Warren, DO  02/18/24 2124

## 2024-02-18 NOTE — ED TRIAGE NOTES
BIBA from home. Per EMS pt c/o abdominal pain, N/V/D since Tuesday. /79 HR 80, BG 91. IV 20 G placed. Zofran 4 mg, Fentanyl 50 mcg  and Normal Saline 400cc given.  Hx endometriosis.

## 2024-02-18 NOTE — ED NOTES
Bed: ED12  Expected date:   Expected time:   Means of arrival:   Comments:  HEMS 412 28F abdominal pain, history of endometriosis, vitally stable, fentanyl administered eta 0603

## 2024-02-19 LAB
ALBUMIN SERPL BCG-MCNC: 4.3 G/DL (ref 3.5–5.2)
ALP SERPL-CCNC: 75 U/L (ref 40–150)
ALT SERPL W P-5'-P-CCNC: 10 U/L (ref 0–50)
ANION GAP SERPL CALCULATED.3IONS-SCNC: 11 MMOL/L (ref 7–15)
AST SERPL W P-5'-P-CCNC: 19 U/L (ref 0–45)
BASOPHILS # BLD AUTO: 0 10E3/UL (ref 0–0.2)
BASOPHILS NFR BLD AUTO: 0 %
BILIRUB DIRECT SERPL-MCNC: <0.2 MG/DL (ref 0–0.3)
BILIRUB SERPL-MCNC: 0.2 MG/DL
BUN SERPL-MCNC: 10.6 MG/DL (ref 6–20)
CALCIUM SERPL-MCNC: 9.7 MG/DL (ref 8.6–10)
CHLORIDE SERPL-SCNC: 102 MMOL/L (ref 98–107)
CREAT SERPL-MCNC: 0.64 MG/DL (ref 0.51–0.95)
DEPRECATED HCO3 PLAS-SCNC: 27 MMOL/L (ref 22–29)
EGFRCR SERPLBLD CKD-EPI 2021: >90 ML/MIN/1.73M2
EOSINOPHIL # BLD AUTO: 1 10E3/UL (ref 0–0.7)
EOSINOPHIL NFR BLD AUTO: 10 %
ERYTHROCYTE [DISTWIDTH] IN BLOOD BY AUTOMATED COUNT: 13.4 % (ref 10–15)
GLUCOSE SERPL-MCNC: 98 MG/DL (ref 70–99)
HCG INTACT+B SERPL-ACNC: <1 MIU/ML
HCT VFR BLD AUTO: 37.3 % (ref 35–47)
HGB BLD-MCNC: 12.3 G/DL (ref 11.7–15.7)
IMM GRANULOCYTES # BLD: 0 10E3/UL
IMM GRANULOCYTES NFR BLD: 0 %
LIPASE SERPL-CCNC: 40 U/L (ref 13–60)
LYMPHOCYTES # BLD AUTO: 3.7 10E3/UL (ref 0.8–5.3)
LYMPHOCYTES NFR BLD AUTO: 39 %
MCH RBC QN AUTO: 27.9 PG (ref 26.5–33)
MCHC RBC AUTO-ENTMCNC: 33 G/DL (ref 31.5–36.5)
MCV RBC AUTO: 85 FL (ref 78–100)
MONOCYTES # BLD AUTO: 0.6 10E3/UL (ref 0–1.3)
MONOCYTES NFR BLD AUTO: 6 %
NEUTROPHILS # BLD AUTO: 4.1 10E3/UL (ref 1.6–8.3)
NEUTROPHILS NFR BLD AUTO: 45 %
NRBC # BLD AUTO: 0 10E3/UL
NRBC BLD AUTO-RTO: 0 /100
PLATELET # BLD AUTO: 363 10E3/UL (ref 150–450)
POTASSIUM SERPL-SCNC: 3.7 MMOL/L (ref 3.4–5.3)
PROT SERPL-MCNC: 7.6 G/DL (ref 6.4–8.3)
RBC # BLD AUTO: 4.41 10E6/UL (ref 3.8–5.2)
SODIUM SERPL-SCNC: 140 MMOL/L (ref 135–145)
WBC # BLD AUTO: 9.4 10E3/UL (ref 4–11)

## 2024-02-19 PROCEDURE — 82248 BILIRUBIN DIRECT: CPT | Performed by: EMERGENCY MEDICINE

## 2024-02-19 PROCEDURE — 80053 COMPREHEN METABOLIC PANEL: CPT | Performed by: EMERGENCY MEDICINE

## 2024-02-19 PROCEDURE — 83690 ASSAY OF LIPASE: CPT | Performed by: EMERGENCY MEDICINE

## 2024-02-19 PROCEDURE — 99285 EMERGENCY DEPT VISIT HI MDM: CPT | Mod: 25

## 2024-02-19 PROCEDURE — 85025 COMPLETE CBC W/AUTO DIFF WBC: CPT | Performed by: EMERGENCY MEDICINE

## 2024-02-19 PROCEDURE — 84702 CHORIONIC GONADOTROPIN TEST: CPT | Performed by: EMERGENCY MEDICINE

## 2024-02-19 PROCEDURE — 36415 COLL VENOUS BLD VENIPUNCTURE: CPT | Performed by: EMERGENCY MEDICINE

## 2024-02-19 PROCEDURE — 80048 BASIC METABOLIC PNL TOTAL CA: CPT | Performed by: EMERGENCY MEDICINE

## 2024-02-20 ENCOUNTER — APPOINTMENT (OUTPATIENT)
Dept: ULTRASOUND IMAGING | Facility: CLINIC | Age: 29
End: 2024-02-20
Attending: EMERGENCY MEDICINE
Payer: COMMERCIAL

## 2024-02-20 ENCOUNTER — HOSPITAL ENCOUNTER (EMERGENCY)
Facility: CLINIC | Age: 29
Discharge: HOME | End: 2024-02-20
Attending: EMERGENCY MEDICINE | Admitting: EMERGENCY MEDICINE
Payer: COMMERCIAL

## 2024-02-20 VITALS
RESPIRATION RATE: 16 BRPM | DIASTOLIC BLOOD PRESSURE: 88 MMHG | TEMPERATURE: 97.9 F | OXYGEN SATURATION: 95 % | HEART RATE: 100 BPM | SYSTOLIC BLOOD PRESSURE: 110 MMHG

## 2024-02-20 DIAGNOSIS — R10.9 RECURRENT ABDOMINAL PAIN: ICD-10-CM

## 2024-02-20 PROCEDURE — 76830 TRANSVAGINAL US NON-OB: CPT

## 2024-02-20 PROCEDURE — 250N000011 HC RX IP 250 OP 636: Performed by: EMERGENCY MEDICINE

## 2024-02-20 PROCEDURE — 258N000003 HC RX IP 258 OP 636: Performed by: EMERGENCY MEDICINE

## 2024-02-20 PROCEDURE — 96361 HYDRATE IV INFUSION ADD-ON: CPT

## 2024-02-20 PROCEDURE — 250N000013 HC RX MED GY IP 250 OP 250 PS 637: Performed by: EMERGENCY MEDICINE

## 2024-02-20 PROCEDURE — 96374 THER/PROPH/DIAG INJ IV PUSH: CPT

## 2024-02-20 PROCEDURE — 93976 VASCULAR STUDY: CPT

## 2024-02-20 RX ORDER — KETOROLAC TROMETHAMINE 15 MG/ML
10 INJECTION, SOLUTION INTRAMUSCULAR; INTRAVENOUS ONCE
Status: COMPLETED | OUTPATIENT
Start: 2024-02-20 | End: 2024-02-20

## 2024-02-20 RX ORDER — ACETAMINOPHEN 500 MG
1000 TABLET ORAL ONCE
Status: COMPLETED | OUTPATIENT
Start: 2024-02-20 | End: 2024-02-20

## 2024-02-20 RX ADMIN — ACETAMINOPHEN 1000 MG: 500 TABLET, FILM COATED ORAL at 03:36

## 2024-02-20 RX ADMIN — SODIUM CHLORIDE, POTASSIUM CHLORIDE, SODIUM LACTATE AND CALCIUM CHLORIDE 1000 ML: 600; 310; 30; 20 INJECTION, SOLUTION INTRAVENOUS at 03:36

## 2024-02-20 RX ADMIN — KETOROLAC TROMETHAMINE 10 MG: 15 INJECTION, SOLUTION INTRAMUSCULAR; INTRAVENOUS at 03:35

## 2024-02-20 ASSESSMENT — ACTIVITIES OF DAILY LIVING (ADL)
ADLS_ACUITY_SCORE: 35
ADLS_ACUITY_SCORE: 35

## 2024-02-20 NOTE — DISCHARGE INSTRUCTIONS
Please continue with Tylenol and ibuprofen along with a heating pad, and please follow-up in clinic for further evaluation and management.

## 2024-02-20 NOTE — ED PROVIDER NOTES
History     Chief Complaint:  Abdominal Pain       The history is provided by the patient.      Anna Valdovinos is a 28 year old female with a history of endometriosis, who presents to the ED for worsening lower abdominal pain for approximately 24 hours. Patient also reports black loose stools. Patient reports her abdominal pain began one week ago and presented to the ED on 02/18. She explains that she was told to come back if the pain returned or worsened. Reports her current pain is constant accompanied with nausea. Reports taking Tylenol, ibuprofen and Imodium today with minimal relief. Reports history of abdominal pain and usually has about 2 episodes a year. Denies following up with a gynecologist or GI provider but reports she is on the wait list for a GI provider.     Independent Historian:   None - Patient Only    Medications:    Zofran    Past Medical History:    Endometriosis  Anxiety  Lutera  Depression  Herpes simplex vulvovaginitis    Physical Exam   Patient Vitals for the past 24 hrs:   BP Temp Temp src Pulse Resp SpO2   02/20/24 0337 110/88 -- -- 107 -- 95 %   02/19/24 2132 (!) 143/83 97.9  F (36.6  C) Temporal 102 16 98 %      Physical Exam  General: Appears well-developed and well-nourished.   Head: No signs of trauma.   CV: Normal rate and regular rhythm.    Resp: Effort normal and breath sounds normal. No respiratory distress.   GI: Soft. There is lower pelvic tenderness.  No rebound or guarding.  Normal bowel sounds.  No CVA tenderness.  MSK: Normal range of motion. no edema. No Calf tenderness.  Neuro: The patient is alert and oriented. Speech normal.  Skin: Skin is warm and dry. No rash noted.   Psych: normal mood and affect. behavior is normal.       Emergency Department Course     Imaging:  US Pelvis Cmplt w Transvag & Doppler LmtPel Duplex Limited   Final Result   IMPRESSION:     1.  Small to moderate amount of free fluid in the pelvis, nonspecific.      2.  Otherwise negative pelvic  ultrasound.                              Laboratory:  Labs Ordered and Resulted from Time of ED Arrival to Time of ED Departure   CBC WITH PLATELETS AND DIFFERENTIAL - Abnormal       Result Value    WBC Count 9.4      RBC Count 4.41      Hemoglobin 12.3      Hematocrit 37.3      MCV 85      MCH 27.9      MCHC 33.0      RDW 13.4      Platelet Count 363      % Neutrophils 45      % Lymphocytes 39      % Monocytes 6      % Eosinophils 10      % Basophils 0      % Immature Granulocytes 0      NRBCs per 100 WBC 0      Absolute Neutrophils 4.1      Absolute Lymphocytes 3.7      Absolute Monocytes 0.6      Absolute Eosinophils 1.0 (*)     Absolute Basophils 0.0      Absolute Immature Granulocytes 0.0      Absolute NRBCs 0.0     LIPASE - Normal    Lipase 40     BASIC METABOLIC PANEL - Normal    Sodium 140      Potassium 3.7      Chloride 102      Carbon Dioxide (CO2) 27      Anion Gap 11      Urea Nitrogen 10.6      Creatinine 0.64      GFR Estimate >90      Calcium 9.7      Glucose 98     HEPATIC FUNCTION PANEL - Normal    Protein Total 7.6      Albumin 4.3      Bilirubin Total 0.2      Alkaline Phosphatase 75      AST 19      ALT 10      Bilirubin Direct <0.20     HCG QUANTITATIVE PREGNANCY - Normal    hCG Quantitative <1        Procedures     Emergency Department Course & Assessments:         Interventions:  Medications   ketorolac (TORADOL) injection 10 mg (10 mg Intravenous $Given 2/20/24 0335)   acetaminophen (TYLENOL) tablet 1,000 mg (1,000 mg Oral $Given 2/20/24 0336)   lactated ringers BOLUS 1,000 mL (0 mLs Intravenous Stopped 2/20/24 0645)        Assessments:  0316 I obtained history and examined the patient as noted above.  0635 I rechecked the patient and explained findings. We discussed plan for discharge and patient is in agreement with plan.     Independent Interpretation (X-rays, CTs, rhythm strip):  None    Consultations/Discussion of Management or Tests:  None        Social Determinants of Health affecting  care:   None    Disposition:  The patient was discharged.     Impression & Plan    CMS Diagnoses: None    Medical Decision Making:  Anna Valdovinos presents with lower abdominal pain.  She has had this over the last couple of days and had been seen yesterday for the same complaint.  When she had continued symptoms, she returned to the ER.  On evaluation, she did have some mild tenderness to the lower abdomen but she is not peritoneal.  I did obtain repeat blood work which was reassuring.  I also obtained a pelvic ultrasound which did not show any signs of torsion, mass, cyst, or other concerning process.  In speaking with the patient, she has had this pain recurrently for some time.  She has plans to follow-up with the GI doctor, and also discussed seeing a gynecologist given her reported history of endometriosis.  I considered, but did not feel that a CT scan was indicated.  She was given the above medications with overall improvement of her symptoms.  I did not feel that opiates were indicated for this recurrent chronic pain.  Patient was discharged home with x-rays for supportive care and follow-up in clinic      Diagnosis:    ICD-10-CM    1. Recurrent abdominal pain  R10.9                  Scribe Disclosure:  I, Lara Spangler, am serving as a scribe at 3:46 AM on 2/20/2024 to document services personally performed by Ayan Bolton MD based on my observations and the provider's statements to me.   2/20/2024   Ayan Bolton MD Bergenstal, John A, MD  02/21/24 0814

## 2024-02-20 NOTE — ED TRIAGE NOTES
"Seen here yesterday for abd pain and N/V. Told to come back if symptoms worsened. States did had \"dark\" loose stool earlier today.      Triage Assessment (Adult)       Row Name 02/19/24 6383          Triage Assessment    Airway WDL WDL        Respiratory WDL    Respiratory WDL WDL        Skin Circulation/Temperature WDL    Skin Circulation/Temperature WDL WDL        Cardiac WDL    Cardiac WDL WDL        Peripheral/Neurovascular WDL    Peripheral Neurovascular WDL WDL        Cognitive/Neuro/Behavioral WDL    Cognitive/Neuro/Behavioral WDL WDL                     "

## 2024-03-27 ENCOUNTER — APPOINTMENT (OUTPATIENT)
Dept: GENERAL RADIOLOGY | Facility: CLINIC | Age: 29
End: 2024-03-27
Attending: EMERGENCY MEDICINE
Payer: COMMERCIAL

## 2024-03-27 ENCOUNTER — HOSPITAL ENCOUNTER (EMERGENCY)
Facility: CLINIC | Age: 29
Discharge: HOME | End: 2024-03-27
Attending: EMERGENCY MEDICINE | Admitting: EMERGENCY MEDICINE
Payer: COMMERCIAL

## 2024-03-27 VITALS
WEIGHT: 133 LBS | HEIGHT: 61 IN | HEART RATE: 72 BPM | SYSTOLIC BLOOD PRESSURE: 122 MMHG | RESPIRATION RATE: 16 BRPM | BODY MASS INDEX: 25.11 KG/M2 | DIASTOLIC BLOOD PRESSURE: 68 MMHG | OXYGEN SATURATION: 99 % | TEMPERATURE: 97.9 F

## 2024-03-27 DIAGNOSIS — S20.212A CHEST WALL CONTUSION, LEFT, INITIAL ENCOUNTER: ICD-10-CM

## 2024-03-27 DIAGNOSIS — S09.90XA CLOSED HEAD INJURY, INITIAL ENCOUNTER: ICD-10-CM

## 2024-03-27 DIAGNOSIS — R40.20 LOSS OF CONSCIOUSNESS (H): ICD-10-CM

## 2024-03-27 LAB
ALBUMIN SERPL BCG-MCNC: 4.6 G/DL (ref 3.5–5.2)
ALP SERPL-CCNC: 62 U/L (ref 40–150)
ALT SERPL W P-5'-P-CCNC: 12 U/L (ref 0–50)
ANION GAP SERPL CALCULATED.3IONS-SCNC: 16 MMOL/L (ref 7–15)
AST SERPL W P-5'-P-CCNC: 16 U/L (ref 0–45)
BASOPHILS # BLD AUTO: 0.1 10E3/UL (ref 0–0.2)
BASOPHILS NFR BLD AUTO: 1 %
BILIRUB SERPL-MCNC: 0.3 MG/DL
BUN SERPL-MCNC: 7.2 MG/DL (ref 6–20)
CALCIUM SERPL-MCNC: 9.2 MG/DL (ref 8.6–10)
CHLORIDE SERPL-SCNC: 105 MMOL/L (ref 98–107)
CREAT SERPL-MCNC: 0.73 MG/DL (ref 0.51–0.95)
DEPRECATED HCO3 PLAS-SCNC: 20 MMOL/L (ref 22–29)
EGFRCR SERPLBLD CKD-EPI 2021: >90 ML/MIN/1.73M2
EOSINOPHIL # BLD AUTO: 0.1 10E3/UL (ref 0–0.7)
EOSINOPHIL NFR BLD AUTO: 1 %
ERYTHROCYTE [DISTWIDTH] IN BLOOD BY AUTOMATED COUNT: 13.4 % (ref 10–15)
GLUCOSE SERPL-MCNC: 90 MG/DL (ref 70–99)
HCG SERPL QL: NEGATIVE
HCT VFR BLD AUTO: 34.7 % (ref 35–47)
HGB BLD-MCNC: 11.8 G/DL (ref 11.7–15.7)
IMM GRANULOCYTES # BLD: 0 10E3/UL
IMM GRANULOCYTES NFR BLD: 0 %
LYMPHOCYTES # BLD AUTO: 3.9 10E3/UL (ref 0.8–5.3)
LYMPHOCYTES NFR BLD AUTO: 35 %
MCH RBC QN AUTO: 28.6 PG (ref 26.5–33)
MCHC RBC AUTO-ENTMCNC: 34 G/DL (ref 31.5–36.5)
MCV RBC AUTO: 84 FL (ref 78–100)
MONOCYTES # BLD AUTO: 0.6 10E3/UL (ref 0–1.3)
MONOCYTES NFR BLD AUTO: 6 %
NEUTROPHILS # BLD AUTO: 6.6 10E3/UL (ref 1.6–8.3)
NEUTROPHILS NFR BLD AUTO: 57 %
NRBC # BLD AUTO: 0 10E3/UL
NRBC BLD AUTO-RTO: 0 /100
PLATELET # BLD AUTO: 347 10E3/UL (ref 150–450)
POTASSIUM SERPL-SCNC: 3.4 MMOL/L (ref 3.4–5.3)
PROT SERPL-MCNC: 7.8 G/DL (ref 6.4–8.3)
RBC # BLD AUTO: 4.12 10E6/UL (ref 3.8–5.2)
SODIUM SERPL-SCNC: 141 MMOL/L (ref 135–145)
TROPONIN T SERPL HS-MCNC: <6 NG/L
WBC # BLD AUTO: 11.4 10E3/UL (ref 4–11)

## 2024-03-27 PROCEDURE — 71046 X-RAY EXAM CHEST 2 VIEWS: CPT

## 2024-03-27 PROCEDURE — 99284 EMERGENCY DEPT VISIT MOD MDM: CPT | Mod: 25

## 2024-03-27 PROCEDURE — 36415 COLL VENOUS BLD VENIPUNCTURE: CPT | Performed by: EMERGENCY MEDICINE

## 2024-03-27 PROCEDURE — 84484 ASSAY OF TROPONIN QUANT: CPT | Performed by: EMERGENCY MEDICINE

## 2024-03-27 PROCEDURE — 85025 COMPLETE CBC W/AUTO DIFF WBC: CPT | Performed by: EMERGENCY MEDICINE

## 2024-03-27 PROCEDURE — 80053 COMPREHEN METABOLIC PANEL: CPT | Performed by: EMERGENCY MEDICINE

## 2024-03-27 PROCEDURE — 96374 THER/PROPH/DIAG INJ IV PUSH: CPT

## 2024-03-27 PROCEDURE — 250N000013 HC RX MED GY IP 250 OP 250 PS 637: Performed by: EMERGENCY MEDICINE

## 2024-03-27 PROCEDURE — 96375 TX/PRO/DX INJ NEW DRUG ADDON: CPT

## 2024-03-27 PROCEDURE — 96361 HYDRATE IV INFUSION ADD-ON: CPT

## 2024-03-27 PROCEDURE — 258N000003 HC RX IP 258 OP 636: Performed by: EMERGENCY MEDICINE

## 2024-03-27 PROCEDURE — 250N000011 HC RX IP 250 OP 636: Performed by: EMERGENCY MEDICINE

## 2024-03-27 PROCEDURE — 84703 CHORIONIC GONADOTROPIN ASSAY: CPT | Performed by: EMERGENCY MEDICINE

## 2024-03-27 RX ORDER — ONDANSETRON 2 MG/ML
4 INJECTION INTRAMUSCULAR; INTRAVENOUS
Status: DISCONTINUED | OUTPATIENT
Start: 2024-03-27 | End: 2024-03-27 | Stop reason: HOSPADM

## 2024-03-27 RX ORDER — KETOROLAC TROMETHAMINE 15 MG/ML
15 INJECTION, SOLUTION INTRAMUSCULAR; INTRAVENOUS ONCE
Status: COMPLETED | OUTPATIENT
Start: 2024-03-27 | End: 2024-03-27

## 2024-03-27 RX ORDER — ACETAMINOPHEN 500 MG
1000 TABLET ORAL ONCE
Status: COMPLETED | OUTPATIENT
Start: 2024-03-27 | End: 2024-03-27

## 2024-03-27 RX ADMIN — ONDANSETRON 4 MG: 2 INJECTION INTRAMUSCULAR; INTRAVENOUS at 03:58

## 2024-03-27 RX ADMIN — KETOROLAC TROMETHAMINE 15 MG: 15 INJECTION, SOLUTION INTRAMUSCULAR; INTRAVENOUS at 03:58

## 2024-03-27 RX ADMIN — ACETAMINOPHEN 1000 MG: 500 TABLET, FILM COATED ORAL at 05:10

## 2024-03-27 RX ADMIN — SODIUM CHLORIDE 1000 ML: 9 INJECTION, SOLUTION INTRAVENOUS at 03:54

## 2024-03-27 ASSESSMENT — ACTIVITIES OF DAILY LIVING (ADL)
ADLS_ACUITY_SCORE: 35
ADLS_ACUITY_SCORE: 35

## 2024-03-27 NOTE — ED NOTES
Patient walked to the bathroom and back to her room without any issues.  No dizziness, nausea or SOB.  Patient walked without assistance

## 2024-03-27 NOTE — ED TRIAGE NOTES
Pt was getting into shower and had a syncopal epidose. Hx of seizures, unsure if this was cause. Right head pain noted, pt unsure if she hit her head. Since fall at around 0200 pt has had photosensitivity, nausea, and vomiting. VSS en route. 4mg oDT zofran given. BG 99.

## 2024-03-27 NOTE — ED PROVIDER NOTES
"History   Chief Complaint:  Loss of consciousness    HPI   Anna Valdovinos is a 28 year old female who presents for evaluation of loss of consciousness.  She states that she was getting out of the shower, she lost consciousness, her fiancé found her promptly, she was slightly confused for a bit.  She hit the right side of her head.  She has had some nausea photosensitivity and vomiting since then.  She states that she was previously diagnosed with \"FND and PNES\" by a neurologist in [place] where she previously lived, but she has doubts about this diagnosis.  She takes oral birth control and some allergy medication.  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  She is currently unemployed and lives here in the [facility].  No history of abnormal heart rhythms.  No tongue biting or urinary incontinence.  She had a slightly loose stool earlier today.    Independent Historian: EMS, who reports that her blood sugar was 99.    Medications:    ondansetron (ZOFRAN ODT) 4 MG ODT tab      Past Medical History:    Functional neurologic disorder  PNES per patient    Physical Exam   Patient Vitals for the past 24 hrs:   BP Temp Temp src Pulse Resp SpO2 Height Weight   03/27/24 0512 -- -- -- -- -- 99 % -- --   03/27/24 0511 122/68 -- -- 72 -- -- -- --   03/27/24 0345 126/71 97.9  F (36.6  C) Oral 93 16 100 % 1.549 m (5' 1\") 60.3 kg (133 lb)      Physical Exam  General: Nontoxic-appearing woman recumbent in the gurney in room 20, stuffed animal in lap  HENT: mucous membranes moist, tongue normal, mild tenderness to R scalp without point tenderness, no hemotympanum bilaterally  CV: rate as above, regular rhythm, no murmur audible, no LE edema  Resp: normal effort, speaks in full phrases, no stridor, no cough observed  GI: abdomen soft and nontender, no guarding  MSK: no bony tenderness, FROM neck  Skin: appropriately warm and dry  Neuro: awake, " alert, clear speech, fully oriented, face symmetric,  normal, strength and sensation intact in all extr, no nuchal rigidity, ambulation not initially tested  Psych: anxious, cooperative    Emergency Department Course   Electrocardiogram  ECG taken at 0335, ECG interpreted at 0420 by LAUREN Blair MD  Sinus rhythm  Rate 90 bpm. MT interval 138. QRS duration 78. QTc 457.    Imaging:  XR Chest 2 Views   Final Result   IMPRESSION: Negative chest.           Laboratory:  Labs Ordered and Resulted from Time of ED Arrival to Time of ED Departure   COMPREHENSIVE METABOLIC PANEL - Abnormal       Result Value    Sodium 141      Potassium 3.4      Carbon Dioxide (CO2) 20 (*)     Anion Gap 16 (*)     Urea Nitrogen 7.2      Creatinine 0.73      GFR Estimate >90      Calcium 9.2      Chloride 105      Glucose 90      Alkaline Phosphatase 62      AST 16      ALT 12      Protein Total 7.8      Albumin 4.6      Bilirubin Total 0.3     CBC WITH PLATELETS AND DIFFERENTIAL - Abnormal    WBC Count 11.4 (*)     RBC Count 4.12      Hemoglobin 11.8      Hematocrit 34.7 (*)     MCV 84      MCH 28.6      MCHC 34.0      RDW 13.4      Platelet Count 347      % Neutrophils 57      % Lymphocytes 35      % Monocytes 6      % Eosinophils 1      % Basophils 1      % Immature Granulocytes 0      NRBCs per 100 WBC 0      Absolute Neutrophils 6.6      Absolute Lymphocytes 3.9      Absolute Monocytes 0.6      Absolute Eosinophils 0.1      Absolute Basophils 0.1      Absolute Immature Granulocytes 0.0      Absolute NRBCs 0.0     TROPONIN T, HIGH SENSITIVITY - Normal    Troponin T, High Sensitivity <6     HCG QUALITATIVE PREGNANCY - Normal    hCG Serum Qualitative Negative        Emergency Department Course:  Reviewed:  I reviewed nursing notes, vitals, and past medical history    Assessments/Consultations/Discussion of Management or Tests :  I obtained history and examined the patient as noted above.   ED Course as of 03/27/24 0656   Wed Mar 27, 2024    0402 I rechecked patient, spoke with suresh now at bedside too.  Tender to L lower anterior chest wall, CXR ordered and APAP.  Pt declined other analgesics including lidoderm.  I spoke with RN to coordinate care.   0541 I rechecked patient, discussed test results.       Independent Interpretation (X-rays, CTs, rhythm strip):  I personally reviewed CXR images, I see no focal infiltrate.    Interventions:  Medications   ondansetron (ZOFRAN) injection 4 mg (4 mg Intravenous $Given 3/27/24 0352)   ketorolac (TORADOL) injection 15 mg (15 mg Intravenous $Given 3/27/24 0358)   sodium chloride 0.9% BOLUS 1,000 mL (0 mLs Intravenous Stopped 3/27/24 0506)   acetaminophen (TYLENOL) tablet 1,000 mg (1,000 mg Oral $Given 3/27/24 0510)        Social Determinants of Health affecting care:   Healthcare Access/Compliance    Disposition:  Discharged    Impression & Plan    Medical Decision Making:  Regarding her loss of consciousness, this was unwitnessed, may represent a vagal event or transient hypotension after taking a shower.  She reports a history of functional neurologic disorder as well as psychogenic nonepileptic seizures as well, this may be playing some role.  From a trauma standpoint, she has some tenderness to her right head though I think that the likelihood of skull fracture or intracranial hemorrhage is sufficiently low that emergent neuroimaging can be safely deferred.  This was discussed with her, CT head was offered and she declines this.  Mental status is excellent, neurologic exam is nonfocal.  She also was later noted to have some chest wall tenderness, x-ray shows no displaced rib fracture, no pneumothorax, and her vital signs are excellent.  In the absence of focal neurologic signs or symptoms my suspicion for stroke is very low.  Seizure considered though given her report of functional neurologic disorder and PNES I do not think that initiation of antiepileptic medication is indicated.  She is tolerated a  road test here and is eating and drinking.  I considered her appropriate for discharge and close outpatient follow-up.  Return here for sudden worsening at any hour.  Patient and fiancé involved in these conversations and in agreement.    Diagnosis:    ICD-10-CM    1. Loss of consciousness (H)  R40.20       2. Closed head injury, initial encounter  S09.90XA       3. Chest wall contusion, left, initial encounter  S20.212A          3/27/2024   MD Johnnie De La O, Dwayne Perez MD  03/27/24 0657

## 2024-03-27 NOTE — ED NOTES
Bed: ED20  Expected date: 3/27/24  Expected time: 3:35 AM  Means of arrival: Ambulance  Comments:  HEMS 437 28F syncope, hit head

## 2024-05-03 ENCOUNTER — HOSPITAL ENCOUNTER (EMERGENCY)
Facility: CLINIC | Age: 29
Discharge: HOME | End: 2024-05-04
Attending: EMERGENCY MEDICINE | Admitting: EMERGENCY MEDICINE
Payer: COMMERCIAL

## 2024-05-03 DIAGNOSIS — R11.2 NAUSEA AND VOMITING, UNSPECIFIED VOMITING TYPE: ICD-10-CM

## 2024-05-03 LAB
ANION GAP SERPL CALCULATED.3IONS-SCNC: 14 MMOL/L (ref 7–15)
BASOPHILS # BLD AUTO: 0.1 10E3/UL (ref 0–0.2)
BASOPHILS NFR BLD AUTO: 1 %
BUN SERPL-MCNC: 10.5 MG/DL (ref 6–20)
CALCIUM SERPL-MCNC: 9 MG/DL (ref 8.6–10)
CHLORIDE SERPL-SCNC: 104 MMOL/L (ref 98–107)
CREAT SERPL-MCNC: 0.65 MG/DL (ref 0.51–0.95)
DEPRECATED HCO3 PLAS-SCNC: 19 MMOL/L (ref 22–29)
EGFRCR SERPLBLD CKD-EPI 2021: >90 ML/MIN/1.73M2
EOSINOPHIL # BLD AUTO: 0.1 10E3/UL (ref 0–0.7)
EOSINOPHIL NFR BLD AUTO: 1 %
ERYTHROCYTE [DISTWIDTH] IN BLOOD BY AUTOMATED COUNT: 13 % (ref 10–15)
GLUCOSE SERPL-MCNC: 92 MG/DL (ref 70–99)
HCT VFR BLD AUTO: 40 % (ref 35–47)
HGB BLD-MCNC: 13.3 G/DL (ref 11.7–15.7)
HOLD SPECIMEN: NORMAL
IMM GRANULOCYTES # BLD: 0 10E3/UL
IMM GRANULOCYTES NFR BLD: 0 %
LYMPHOCYTES # BLD AUTO: 3.3 10E3/UL (ref 0.8–5.3)
LYMPHOCYTES NFR BLD AUTO: 42 %
MCH RBC QN AUTO: 28.2 PG (ref 26.5–33)
MCHC RBC AUTO-ENTMCNC: 33.3 G/DL (ref 31.5–36.5)
MCV RBC AUTO: 85 FL (ref 78–100)
MONOCYTES # BLD AUTO: 0.5 10E3/UL (ref 0–1.3)
MONOCYTES NFR BLD AUTO: 6 %
NEUTROPHILS # BLD AUTO: 4 10E3/UL (ref 1.6–8.3)
NEUTROPHILS NFR BLD AUTO: 50 %
NRBC # BLD AUTO: 0 10E3/UL
NRBC BLD AUTO-RTO: 0 /100
PLATELET # BLD AUTO: 355 10E3/UL (ref 150–450)
POTASSIUM SERPL-SCNC: 3.8 MMOL/L (ref 3.4–5.3)
RBC # BLD AUTO: 4.71 10E6/UL (ref 3.8–5.2)
SODIUM SERPL-SCNC: 137 MMOL/L (ref 135–145)
WBC # BLD AUTO: 8 10E3/UL (ref 4–11)

## 2024-05-03 PROCEDURE — 250N000011 HC RX IP 250 OP 636: Performed by: EMERGENCY MEDICINE

## 2024-05-03 PROCEDURE — 36415 COLL VENOUS BLD VENIPUNCTURE: CPT | Performed by: EMERGENCY MEDICINE

## 2024-05-03 PROCEDURE — 84703 CHORIONIC GONADOTROPIN ASSAY: CPT | Performed by: EMERGENCY MEDICINE

## 2024-05-03 PROCEDURE — 80048 BASIC METABOLIC PNL TOTAL CA: CPT | Performed by: EMERGENCY MEDICINE

## 2024-05-03 PROCEDURE — 99284 EMERGENCY DEPT VISIT MOD MDM: CPT | Mod: 25

## 2024-05-03 PROCEDURE — 85025 COMPLETE CBC W/AUTO DIFF WBC: CPT | Performed by: EMERGENCY MEDICINE

## 2024-05-03 PROCEDURE — 80053 COMPREHEN METABOLIC PANEL: CPT | Performed by: EMERGENCY MEDICINE

## 2024-05-03 PROCEDURE — 96374 THER/PROPH/DIAG INJ IV PUSH: CPT

## 2024-05-03 PROCEDURE — 83690 ASSAY OF LIPASE: CPT | Performed by: EMERGENCY MEDICINE

## 2024-05-03 PROCEDURE — 96361 HYDRATE IV INFUSION ADD-ON: CPT

## 2024-05-03 PROCEDURE — 258N000003 HC RX IP 258 OP 636: Performed by: EMERGENCY MEDICINE

## 2024-05-03 PROCEDURE — 82248 BILIRUBIN DIRECT: CPT | Performed by: EMERGENCY MEDICINE

## 2024-05-03 RX ORDER — ONDANSETRON 2 MG/ML
4 INJECTION INTRAMUSCULAR; INTRAVENOUS EVERY 6 HOURS PRN
Status: DISCONTINUED | OUTPATIENT
Start: 2024-05-03 | End: 2024-05-04 | Stop reason: HOSPADM

## 2024-05-03 RX ADMIN — ONDANSETRON 4 MG: 2 INJECTION INTRAMUSCULAR; INTRAVENOUS at 23:07

## 2024-05-03 RX ADMIN — SODIUM CHLORIDE 1000 ML: 9 INJECTION, SOLUTION INTRAVENOUS at 23:07

## 2024-05-03 ASSESSMENT — ACTIVITIES OF DAILY LIVING (ADL)
ADLS_ACUITY_SCORE: 35

## 2024-05-03 ASSESSMENT — COLUMBIA-SUICIDE SEVERITY RATING SCALE - C-SSRS
6. HAVE YOU EVER DONE ANYTHING, STARTED TO DO ANYTHING, OR PREPARED TO DO ANYTHING TO END YOUR LIFE?: NO
2. HAVE YOU ACTUALLY HAD ANY THOUGHTS OF KILLING YOURSELF IN THE PAST MONTH?: NO
1. IN THE PAST MONTH, HAVE YOU WISHED YOU WERE DEAD OR WISHED YOU COULD GO TO SLEEP AND NOT WAKE UP?: NO

## 2024-05-03 NOTE — LETTER
May 4, 2024      To Whom It May Concern:      Anna Valdovinos was seen in our Emergency Department overnight 05/03/24 to 05/04/24. Please excuse her from work on 05/03/24.      Sincerely,        Genny Rush RN

## 2024-05-04 VITALS
OXYGEN SATURATION: 99 % | BODY MASS INDEX: 24.92 KG/M2 | HEART RATE: 82 BPM | SYSTOLIC BLOOD PRESSURE: 111 MMHG | HEIGHT: 61 IN | TEMPERATURE: 100.1 F | WEIGHT: 132 LBS | DIASTOLIC BLOOD PRESSURE: 86 MMHG | RESPIRATION RATE: 18 BRPM

## 2024-05-04 LAB
ALBUMIN SERPL BCG-MCNC: 4.3 G/DL (ref 3.5–5.2)
ALP SERPL-CCNC: 57 U/L (ref 40–150)
ALT SERPL W P-5'-P-CCNC: 13 U/L (ref 0–50)
AST SERPL W P-5'-P-CCNC: 29 U/L (ref 0–45)
BILIRUB DIRECT SERPL-MCNC: <0.2 MG/DL (ref 0–0.3)
BILIRUB SERPL-MCNC: 0.3 MG/DL
FLUAV RNA SPEC QL NAA+PROBE: NEGATIVE
FLUBV RNA RESP QL NAA+PROBE: NEGATIVE
HCG SERPL QL: NEGATIVE
LIPASE SERPL-CCNC: 34 U/L (ref 13–60)
PROT SERPL-MCNC: 7.7 G/DL (ref 6.4–8.3)
RSV RNA SPEC NAA+PROBE: NEGATIVE
SARS-COV-2 RNA RESP QL NAA+PROBE: NEGATIVE

## 2024-05-04 PROCEDURE — 96375 TX/PRO/DX INJ NEW DRUG ADDON: CPT

## 2024-05-04 PROCEDURE — 87637 SARSCOV2&INF A&B&RSV AMP PRB: CPT | Performed by: EMERGENCY MEDICINE

## 2024-05-04 PROCEDURE — 96361 HYDRATE IV INFUSION ADD-ON: CPT

## 2024-05-04 PROCEDURE — 258N000003 HC RX IP 258 OP 636: Performed by: EMERGENCY MEDICINE

## 2024-05-04 PROCEDURE — 250N000011 HC RX IP 250 OP 636: Mod: JZ | Performed by: EMERGENCY MEDICINE

## 2024-05-04 RX ORDER — METOCLOPRAMIDE HYDROCHLORIDE 5 MG/ML
10 INJECTION INTRAMUSCULAR; INTRAVENOUS ONCE
Status: COMPLETED | OUTPATIENT
Start: 2024-05-04 | End: 2024-05-04

## 2024-05-04 RX ADMIN — METOCLOPRAMIDE 10 MG: 5 INJECTION, SOLUTION INTRAMUSCULAR; INTRAVENOUS at 02:50

## 2024-05-04 RX ADMIN — SODIUM CHLORIDE 1000 ML: 9 INJECTION, SOLUTION INTRAVENOUS at 02:50

## 2024-05-04 RX ADMIN — SODIUM CHLORIDE 1000 ML: 9 INJECTION, SOLUTION INTRAVENOUS at 01:35

## 2024-05-04 ASSESSMENT — ACTIVITIES OF DAILY LIVING (ADL)
ADLS_ACUITY_SCORE: 35

## 2024-05-04 NOTE — ED TRIAGE NOTES
Pt arrives via triage presenting with nausea/vomiting since Monday. Pt reports loose stools throughout the week, denies abdominal pain.      Triage Assessment (Adult)       Row Name 05/03/24 2018          Triage Assessment    Airway WDL WDL        Respiratory WDL    Respiratory WDL WDL        Skin Circulation/Temperature WDL    Skin Circulation/Temperature WDL WDL        Cardiac WDL    Cardiac WDL WDL        Peripheral/Neurovascular WDL    Peripheral Neurovascular WDL WDL        Cognitive/Neuro/Behavioral WDL    Cognitive/Neuro/Behavioral WDL WDL

## 2024-05-04 NOTE — ED PROVIDER NOTES
"    History     Chief Complaint:  Nausea, Vomiting, & Diarrhea    HPI   Anna Valdovinos is a 28 year old female who presents to the emergency department complaining of abdominal pain, nausea, and vomiting.  She has a history of frequent similar episodes.  She states this feels like her typical pain episodes.  I saw her just over 6 months ago and recommended she follow-up with GI and gave her contact information for Dr. Lora.  She states that she had to cancel her appointment and then has not been able to reschedule until several months from now.  She has not seen a GI doctor for these episodes.  No blood in her vomit or stool though she states she did have green vomiting after several episodes of retching.  This is since resolved.  She now feels better.      Medications:    ondansetron (ZOFRAN ODT) 4 MG ODT tab      Physical Exam   Patient Vitals for the past 24 hrs:   BP Temp Temp src Pulse Resp SpO2 Height Weight   05/03/24 2000 (!) 151/82 100.1  F (37.8  C) Oral 109 18 99 % 1.549 m (5' 1\") 59.9 kg (132 lb)        Physical Exam  General: Resting on the gurney, appears somewhat uncomfortable  Head:  The scalp, face, and head appear normal  Mouth/Throat: Mucus membranes are moist  CV:  Regular rate    Normal S1 and S2  No pathological murmur   Resp:  Breath sounds clear and equal bilaterally    Non-labored, no retractions or accessory muscle use    No coarseness    No wheezing   GI:  Abdomen is soft, no rigidity    Mild diffuse tenderness to palpation. No focal tenderness to palpation  MS:  Normal motor assessment of all extremities.    Good capillary refill noted.  Skin:  No rash or lesions noted.  Neuro:   Speech is normal and fluent. No apparent deficit.  Psych: Awake. Alert.  Normal affect.      Appropriate interactions.      Emergency Department Course     Laboratory:  Labs Ordered and Resulted from Time of ED Arrival to Time of ED Departure   BASIC METABOLIC PANEL - Abnormal       Result Value    Sodium 137 "      Potassium 3.8      Chloride 104      Carbon Dioxide (CO2) 19 (*)     Anion Gap 14      Urea Nitrogen 10.5      Creatinine 0.65      GFR Estimate >90      Calcium 9.0      Glucose 92     HEPATIC FUNCTION PANEL - Normal    Protein Total 7.7      Albumin 4.3      Bilirubin Total 0.3      Alkaline Phosphatase 57      AST 29      ALT 13      Bilirubin Direct <0.20     LIPASE - Normal    Lipase 34     HCG QUALITATIVE PREGNANCY - Normal    hCG Serum Qualitative Negative     INFLUENZA A/B, RSV, & SARS-COV2 PCR - Normal    Influenza A PCR Negative      Influenza B PCR Negative      RSV PCR Negative      SARS CoV2 PCR Negative     CBC WITH PLATELETS AND DIFFERENTIAL    WBC Count 8.0      RBC Count 4.71      Hemoglobin 13.3      Hematocrit 40.0      MCV 85      MCH 28.2      MCHC 33.3      RDW 13.0      Platelet Count 355      % Neutrophils 50      % Lymphocytes 42      % Monocytes 6      % Eosinophils 1      % Basophils 1      % Immature Granulocytes 0      NRBCs per 100 WBC 0      Absolute Neutrophils 4.0      Absolute Lymphocytes 3.3      Absolute Monocytes 0.5      Absolute Eosinophils 0.1      Absolute Basophils 0.1      Absolute Immature Granulocytes 0.0      Absolute NRBCs 0.0            Emergency Department Course & Assessments:    Interventions:  Medications   ondansetron (ZOFRAN) injection 4 mg (4 mg Intravenous $Given 5/3/24 2307)   sodium chloride 0.9% BOLUS 1,000 mL (0 mLs Intravenous Stopped 5/4/24 0049)   sodium chloride 0.9% BOLUS 1,000 mL (0 mLs Intravenous Stopped 5/4/24 0246)   metoclopramide (REGLAN) injection 10 mg (10 mg Intravenous $Given 5/4/24 0250)   sodium chloride 0.9% BOLUS 1,000 mL (1,000 mLs Intravenous $New Bag 5/4/24 0250)        Social Determinants of Health affecting care:  Healthcare Access/Compliance     Disposition:  The patient was discharged.    Impression & Plan      Medical Decision Making:  Anna Valdovinos is a 28 year old female who presents to the emergency department for the  evaluation of abdominal pain and vomiting.  Lab evaluation without significant abnormality.  The patinet was given fluids and zofran and then reglan with significant improvement in symptoms.  The abdominal exam is benign and hepatobiliary disease, obstruction, ischemia, of surgical etiology is highly unlikely.      Lab evaluation shoes no evidence of profound dehydration or electrolyte abnormality.    Considerable symptomatic improvement and hte patient is comfortable with close out patient follow up and strict return precautions. I have again strongly encouraged her to follow up as an outpt with GI as these episodes of abd pain and vomiting have been a recurring struggle for the patient.    Diagnosis:    ICD-10-CM    1. Nausea and vomiting, unspecified vomiting type  R11.2              5/4/2024   Jennifer Sandhu MD Taxman, Karah M, MD  05/04/24 1003

## 2024-08-18 ENCOUNTER — HOSPITAL ENCOUNTER (EMERGENCY)
Facility: CLINIC | Age: 29
Discharge: HOME | End: 2024-08-18
Attending: EMERGENCY MEDICINE | Admitting: EMERGENCY MEDICINE

## 2024-08-18 VITALS
OXYGEN SATURATION: 99 % | RESPIRATION RATE: 20 BRPM | HEART RATE: 103 BPM | TEMPERATURE: 98.3 F | SYSTOLIC BLOOD PRESSURE: 118 MMHG | DIASTOLIC BLOOD PRESSURE: 76 MMHG

## 2024-08-18 DIAGNOSIS — R11.2 NAUSEA AND VOMITING, UNSPECIFIED VOMITING TYPE: ICD-10-CM

## 2024-08-18 DIAGNOSIS — Z77.29 CARBON MONOXIDE EXPOSURE: ICD-10-CM

## 2024-08-18 LAB
ALBUMIN SERPL BCG-MCNC: 4.7 G/DL (ref 3.5–5.2)
ALP SERPL-CCNC: 93 U/L (ref 40–150)
ALT SERPL W P-5'-P-CCNC: 14 U/L (ref 0–50)
ANION GAP SERPL CALCULATED.3IONS-SCNC: 16 MMOL/L (ref 7–15)
AST SERPL W P-5'-P-CCNC: 18 U/L (ref 0–45)
BASOPHILS # BLD AUTO: 0.1 10E3/UL (ref 0–0.2)
BASOPHILS NFR BLD AUTO: 1 %
BILIRUB SERPL-MCNC: 0.5 MG/DL
BUN SERPL-MCNC: 13.8 MG/DL (ref 6–20)
CALCIUM SERPL-MCNC: 9.9 MG/DL (ref 8.8–10.4)
CHLORIDE SERPL-SCNC: 104 MMOL/L (ref 98–107)
COHGB MFR BLD: 3.6 % (ref 0–2)
CREAT SERPL-MCNC: 0.75 MG/DL (ref 0.51–0.95)
EGFRCR SERPLBLD CKD-EPI 2021: >90 ML/MIN/1.73M2
EOSINOPHIL # BLD AUTO: 0.1 10E3/UL (ref 0–0.7)
EOSINOPHIL NFR BLD AUTO: 1 %
ERYTHROCYTE [DISTWIDTH] IN BLOOD BY AUTOMATED COUNT: 13.2 % (ref 10–15)
GLUCOSE SERPL-MCNC: 88 MG/DL (ref 70–99)
HCG SERPL QL: NEGATIVE
HCO3 SERPL-SCNC: 20 MMOL/L (ref 22–29)
HCT VFR BLD AUTO: 42.3 % (ref 35–47)
HGB BLD-MCNC: 14.1 G/DL (ref 11.7–15.7)
IMM GRANULOCYTES # BLD: 0 10E3/UL
IMM GRANULOCYTES NFR BLD: 0 %
LIPASE SERPL-CCNC: 26 U/L (ref 13–60)
LYMPHOCYTES # BLD AUTO: 3.6 10E3/UL (ref 0.8–5.3)
LYMPHOCYTES NFR BLD AUTO: 37 %
MCH RBC QN AUTO: 28.7 PG (ref 26.5–33)
MCHC RBC AUTO-ENTMCNC: 33.3 G/DL (ref 31.5–36.5)
MCV RBC AUTO: 86 FL (ref 78–100)
MONOCYTES # BLD AUTO: 0.5 10E3/UL (ref 0–1.3)
MONOCYTES NFR BLD AUTO: 6 %
NEUTROPHILS # BLD AUTO: 5.4 10E3/UL (ref 1.6–8.3)
NEUTROPHILS NFR BLD AUTO: 56 %
NRBC # BLD AUTO: 0 10E3/UL
NRBC BLD AUTO-RTO: 0 /100
PLATELET # BLD AUTO: 382 10E3/UL (ref 150–450)
POTASSIUM SERPL-SCNC: 3.8 MMOL/L (ref 3.4–5.3)
PROT SERPL-MCNC: 8.5 G/DL (ref 6.4–8.3)
RBC # BLD AUTO: 4.92 10E6/UL (ref 3.8–5.2)
SODIUM SERPL-SCNC: 140 MMOL/L (ref 135–145)
WBC # BLD AUTO: 9.7 10E3/UL (ref 4–11)

## 2024-08-18 PROCEDURE — 83690 ASSAY OF LIPASE: CPT | Performed by: EMERGENCY MEDICINE

## 2024-08-18 PROCEDURE — 82375 ASSAY CARBOXYHB QUANT: CPT | Performed by: EMERGENCY MEDICINE

## 2024-08-18 PROCEDURE — 258N000003 HC RX IP 258 OP 636: Performed by: EMERGENCY MEDICINE

## 2024-08-18 PROCEDURE — 85025 COMPLETE CBC W/AUTO DIFF WBC: CPT | Performed by: EMERGENCY MEDICINE

## 2024-08-18 PROCEDURE — 96361 HYDRATE IV INFUSION ADD-ON: CPT

## 2024-08-18 PROCEDURE — 250N000011 HC RX IP 250 OP 636: Performed by: EMERGENCY MEDICINE

## 2024-08-18 PROCEDURE — 99284 EMERGENCY DEPT VISIT MOD MDM: CPT | Mod: 25

## 2024-08-18 PROCEDURE — 80053 COMPREHEN METABOLIC PANEL: CPT | Performed by: EMERGENCY MEDICINE

## 2024-08-18 PROCEDURE — 84703 CHORIONIC GONADOTROPIN ASSAY: CPT | Performed by: EMERGENCY MEDICINE

## 2024-08-18 PROCEDURE — 36415 COLL VENOUS BLD VENIPUNCTURE: CPT | Performed by: EMERGENCY MEDICINE

## 2024-08-18 PROCEDURE — 96374 THER/PROPH/DIAG INJ IV PUSH: CPT

## 2024-08-18 RX ORDER — ONDANSETRON 2 MG/ML
4 INJECTION INTRAMUSCULAR; INTRAVENOUS ONCE
Status: COMPLETED | OUTPATIENT
Start: 2024-08-18 | End: 2024-08-18

## 2024-08-18 RX ORDER — ONDANSETRON 4 MG/1
4 TABLET, ORALLY DISINTEGRATING ORAL EVERY 6 HOURS PRN
Qty: 15 TABLET | Refills: 0 | Status: SHIPPED | OUTPATIENT
Start: 2024-08-18

## 2024-08-18 RX ORDER — DEXTROSE MONOHYDRATE AND SODIUM CHLORIDE 5; .9 G/100ML; G/100ML
INJECTION, SOLUTION INTRAVENOUS ONCE
Status: COMPLETED | OUTPATIENT
Start: 2024-08-18 | End: 2024-08-18

## 2024-08-18 RX ADMIN — ONDANSETRON 4 MG: 2 INJECTION INTRAMUSCULAR; INTRAVENOUS at 17:34

## 2024-08-18 RX ADMIN — DEXTROSE AND SODIUM CHLORIDE: 5; 900 INJECTION, SOLUTION INTRAVENOUS at 16:41

## 2024-08-18 RX ADMIN — SODIUM CHLORIDE 1000 ML: 9 INJECTION, SOLUTION INTRAVENOUS at 17:34

## 2024-08-18 ASSESSMENT — ACTIVITIES OF DAILY LIVING (ADL)
ADLS_ACUITY_SCORE: 35
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
## 2024-08-18 NOTE — DISCHARGE INSTRUCTIONS
Zofran ODT as needed for nausea  Continue to make certain that there is no additional carbon oxide at your residence.

## 2024-09-16 ENCOUNTER — VIRTUAL VISIT (OUTPATIENT)
Dept: FAMILY MEDICINE | Facility: CLINIC | Age: 29
End: 2024-09-16
Payer: COMMERCIAL

## 2024-09-16 DIAGNOSIS — Z30.41 ENCOUNTER FOR SURVEILLANCE OF CONTRACEPTIVE PILLS: Primary | ICD-10-CM

## 2024-09-16 DIAGNOSIS — F42.9 OBSESSIVE-COMPULSIVE DISORDER, UNSPECIFIED TYPE: ICD-10-CM

## 2024-09-16 DIAGNOSIS — F41.9 ANXIETY: ICD-10-CM

## 2024-09-16 PROCEDURE — 99213 OFFICE O/P EST LOW 20 MIN: CPT | Mod: 95 | Performed by: FAMILY MEDICINE

## 2024-09-16 RX ORDER — DULOXETIN HYDROCHLORIDE 30 MG/1
30 CAPSULE, DELAYED RELEASE ORAL DAILY
COMMUNITY
Start: 2024-09-09

## 2024-09-16 RX ORDER — LEVONORGESTREL/ETHIN.ESTRADIOL 0.1-0.02MG
1 TABLET ORAL DAILY
Qty: 84 TABLET | Refills: 1 | Status: SHIPPED | OUTPATIENT
Start: 2024-09-16

## 2024-09-16 RX ORDER — DULOXETIN HYDROCHLORIDE 60 MG/1
60 CAPSULE, DELAYED RELEASE ORAL DAILY
COMMUNITY
Start: 2024-09-09

## 2024-09-16 RX ORDER — LEVONORGESTREL/ETHIN.ESTRADIOL 0.1-0.02MG
1 TABLET ORAL DAILY
Qty: 84 TABLET | Refills: 1 | Status: SHIPPED | OUTPATIENT
Start: 2024-09-16 | End: 2024-09-16

## 2024-09-16 RX ORDER — LEVONORGESTREL/ETHIN.ESTRADIOL 0.1-0.02MG
1 TABLET ORAL DAILY
COMMUNITY
End: 2024-09-16

## 2024-09-16 NOTE — PROGRESS NOTES
Anna is a 28 year old who is being evaluated via a billable video visit.    How would you like to obtain your AVS? MyChart  If the video visit is dropped, the invitation should be resent by: Send to e-mail at: [email or web address]  Will anyone else be joining your video visit? No      Assessment & Plan     1. Encounter for surveillance of contraceptive pills  She was given a refill of her previous OCP which worked well for her in the past.  - levonorgestrel-ethinyl estradiol (AVIANE) 0.1-20 MG-MCG tablet; Take 1 tablet by mouth daily.  Dispense: 84 tablet; Refill: 1    2. Anxiety  Mental health symptoms are stable on duloxetine 90 mg daily.    3. Obsessive-compulsive disorder, unspecified type  She reports that mental health symptoms are stable on duloxetine 90 mg daily.              Subjective   Anna is a 28 year old, presenting for the following health issues:  Establish Care and Contraception    History of Present Illness       Reason for visit:  Establish primary care, birth coltrol refill   She is taking medications regularly.     She scheduled a virtual visit to discuss restarting Lutera OCP.  She reports using an over-the-counter contraceptive pill which she is not tolerating well and would like to switch back to Lutera.  She has a history of anxiety, depression and OCD which she feels like is under good control on duloxetine 90 mg daily.  She reports that she is not pregnant.  She has been on an oral contraceptive pill and she recently had a negative pregnancy test on 8/18/2024.            Review of Systems  Constitutional, HEENT, cardiovascular, pulmonary, GI, , musculoskeletal, neuro, skin, endocrine and psych systems are negative, except as otherwise noted.      Objective           Vitals:  No vitals were obtained today due to virtual visit.    Physical Exam   GENERAL: alert and no distress  EYES: Eyes grossly normal to inspection.  No discharge or erythema, or obvious scleral/conjunctival  abnormalities.  RESP: No audible wheeze, cough, or visible cyanosis.    SKIN: Visible skin clear. No significant rash, abnormal pigmentation or lesions.  NEURO: Cranial nerves grossly intact.  Mentation and speech appropriate for age.  PSYCH: Appropriate affect, tone, and pace of words          Video-Visit Details    Type of service:  Video Visit   Originating Location (pt. Location): Home    Distant Location (provider location):  On-site  Platform used for Video Visit: Ramo  Signed Electronically by: Yehuda Kaye DO

## 2024-11-23 ENCOUNTER — HOSPITAL ENCOUNTER (EMERGENCY)
Facility: CLINIC | Age: 29
Discharge: HOME | End: 2024-11-23
Attending: EMERGENCY MEDICINE | Admitting: EMERGENCY MEDICINE

## 2024-11-23 ENCOUNTER — APPOINTMENT (OUTPATIENT)
Dept: CT IMAGING | Facility: CLINIC | Age: 29
End: 2024-11-23
Attending: EMERGENCY MEDICINE

## 2024-11-23 VITALS
HEART RATE: 87 BPM | HEIGHT: 61 IN | RESPIRATION RATE: 21 BRPM | DIASTOLIC BLOOD PRESSURE: 85 MMHG | WEIGHT: 120 LBS | TEMPERATURE: 98.3 F | OXYGEN SATURATION: 98 % | SYSTOLIC BLOOD PRESSURE: 123 MMHG | BODY MASS INDEX: 22.66 KG/M2

## 2024-11-23 DIAGNOSIS — R56.9 SEIZURE-LIKE ACTIVITY (H): Primary | ICD-10-CM

## 2024-11-23 LAB
ALBUMIN SERPL BCG-MCNC: 4.5 G/DL (ref 3.5–5.2)
ALBUMIN UR-MCNC: 70 MG/DL
ALP SERPL-CCNC: 69 U/L (ref 40–150)
ALT SERPL W P-5'-P-CCNC: 17 U/L (ref 0–50)
AMPHETAMINES UR QL SCN: ABNORMAL
ANION GAP SERPL CALCULATED.3IONS-SCNC: 14 MMOL/L (ref 7–15)
APPEARANCE UR: ABNORMAL
AST SERPL W P-5'-P-CCNC: 18 U/L (ref 0–45)
ATRIAL RATE - MUSE: 82 BPM
BACTERIA #/AREA URNS HPF: ABNORMAL /HPF
BARBITURATES UR QL SCN: ABNORMAL
BASE EXCESS BLDV CALC-SCNC: -18 MMOL/L (ref -3–3)
BASE EXCESS BLDV CALC-SCNC: -3 MMOL/L (ref -3–3)
BASOPHILS # BLD AUTO: 0 10E3/UL (ref 0–0.2)
BASOPHILS NFR BLD AUTO: 0 %
BENZODIAZ UR QL SCN: ABNORMAL
BILIRUB DIRECT SERPL-MCNC: <0.2 MG/DL (ref 0–0.3)
BILIRUB SERPL-MCNC: 0.4 MG/DL
BILIRUB UR QL STRIP: NEGATIVE
BUN SERPL-MCNC: 11.5 MG/DL (ref 6–20)
BZE UR QL SCN: ABNORMAL
CALCIUM SERPL-MCNC: 9.3 MG/DL (ref 8.8–10.4)
CANNABINOIDS UR QL SCN: ABNORMAL
CHLORIDE SERPL-SCNC: 106 MMOL/L (ref 98–107)
CK SERPL-CCNC: 82 U/L (ref 26–192)
COLOR UR AUTO: YELLOW
CREAT SERPL-MCNC: 0.76 MG/DL (ref 0.51–0.95)
DIASTOLIC BLOOD PRESSURE - MUSE: NORMAL MMHG
EGFRCR SERPLBLD CKD-EPI 2021: >90 ML/MIN/1.73M2
EOSINOPHIL # BLD AUTO: 0 10E3/UL (ref 0–0.7)
EOSINOPHIL NFR BLD AUTO: 0 %
ERYTHROCYTE [DISTWIDTH] IN BLOOD BY AUTOMATED COUNT: 13.7 % (ref 10–15)
ETHANOL SERPL-MCNC: <0.01 G/DL
FENTANYL UR QL: ABNORMAL
GLUCOSE SERPL-MCNC: 103 MG/DL (ref 70–99)
GLUCOSE UR STRIP-MCNC: NEGATIVE MG/DL
HCG SERPL QL: NEGATIVE
HCO3 BLDV-SCNC: 22 MMOL/L (ref 21–28)
HCO3 BLDV-SCNC: 7 MMOL/L (ref 21–28)
HCO3 SERPL-SCNC: 20 MMOL/L (ref 22–29)
HCT VFR BLD AUTO: 37.8 % (ref 35–47)
HGB BLD-MCNC: 12.4 G/DL (ref 11.7–15.7)
HGB UR QL STRIP: ABNORMAL
HOLD SPECIMEN: NORMAL
HOLD SPECIMEN: NORMAL
IMM GRANULOCYTES # BLD: 0 10E3/UL
IMM GRANULOCYTES NFR BLD: 0 %
INTERPRETATION ECG - MUSE: NORMAL
KETONES UR STRIP-MCNC: >150 MG/DL
LACTATE BLD-SCNC: 0.8 MMOL/L
LACTATE BLD-SCNC: <0.3 MMOL/L
LEUKOCYTE ESTERASE UR QL STRIP: ABNORMAL
LYMPHOCYTES # BLD AUTO: 1.6 10E3/UL (ref 0.8–5.3)
LYMPHOCYTES NFR BLD AUTO: 15 %
MAGNESIUM SERPL-MCNC: 2.2 MG/DL (ref 1.7–2.3)
MCH RBC QN AUTO: 27.9 PG (ref 26.5–33)
MCHC RBC AUTO-ENTMCNC: 32.8 G/DL (ref 31.5–36.5)
MCV RBC AUTO: 85 FL (ref 78–100)
MONOCYTES # BLD AUTO: 0.3 10E3/UL (ref 0–1.3)
MONOCYTES NFR BLD AUTO: 3 %
MUCOUS THREADS #/AREA URNS LPF: PRESENT /LPF
NEUTROPHILS # BLD AUTO: 8.3 10E3/UL (ref 1.6–8.3)
NEUTROPHILS NFR BLD AUTO: 81 %
NITRATE UR QL: NEGATIVE
NRBC # BLD AUTO: 0 10E3/UL
NRBC BLD AUTO-RTO: 0 /100
OPIATES UR QL SCN: ABNORMAL
P AXIS - MUSE: 77 DEGREES
PCO2 BLDV: 12 MM HG (ref 40–50)
PCO2 BLDV: 38 MM HG (ref 40–50)
PCP QUAL URINE (ROCHE): ABNORMAL
PH BLDV: 7.36 [PH] (ref 7.32–7.43)
PH BLDV: 7.37 [PH] (ref 7.32–7.43)
PH UR STRIP: 6 [PH] (ref 5–7)
PLATELET # BLD AUTO: 367 10E3/UL (ref 150–450)
PO2 BLDV: 36 MM HG (ref 25–47)
PO2 BLDV: 60 MM HG (ref 25–47)
POTASSIUM SERPL-SCNC: 3.6 MMOL/L (ref 3.4–5.3)
PR INTERVAL - MUSE: 126 MS
PROT SERPL-MCNC: 7.8 G/DL (ref 6.4–8.3)
QRS DURATION - MUSE: 76 MS
QT - MUSE: 364 MS
QTC - MUSE: 425 MS
R AXIS - MUSE: 72 DEGREES
RBC # BLD AUTO: 4.44 10E6/UL (ref 3.8–5.2)
RBC URINE: 25 /HPF
SAO2 % BLDV: 68 % (ref 70–75)
SAO2 % BLDV: 91 % (ref 70–75)
SODIUM SERPL-SCNC: 140 MMOL/L (ref 135–145)
SP GR UR STRIP: 1.03 (ref 1–1.03)
SQUAMOUS EPITHELIAL: 10 /HPF
SYSTOLIC BLOOD PRESSURE - MUSE: NORMAL MMHG
T AXIS - MUSE: 62 DEGREES
TSH SERPL DL<=0.005 MIU/L-ACNC: 1.03 UIU/ML (ref 0.3–4.2)
UROBILINOGEN UR STRIP-MCNC: 2 MG/DL
VENTRICULAR RATE- MUSE: 82 BPM
WBC # BLD AUTO: 10.2 10E3/UL (ref 4–11)
WBC URINE: 14 /HPF

## 2024-11-23 PROCEDURE — 82803 BLOOD GASES ANY COMBINATION: CPT

## 2024-11-23 PROCEDURE — 80307 DRUG TEST PRSMV CHEM ANLYZR: CPT | Performed by: EMERGENCY MEDICINE

## 2024-11-23 PROCEDURE — 84703 CHORIONIC GONADOTROPIN ASSAY: CPT | Performed by: EMERGENCY MEDICINE

## 2024-11-23 PROCEDURE — 85004 AUTOMATED DIFF WBC COUNT: CPT | Performed by: EMERGENCY MEDICINE

## 2024-11-23 PROCEDURE — 87086 URINE CULTURE/COLONY COUNT: CPT | Performed by: EMERGENCY MEDICINE

## 2024-11-23 PROCEDURE — 81001 URINALYSIS AUTO W/SCOPE: CPT | Performed by: EMERGENCY MEDICINE

## 2024-11-23 PROCEDURE — 36415 COLL VENOUS BLD VENIPUNCTURE: CPT | Performed by: EMERGENCY MEDICINE

## 2024-11-23 PROCEDURE — 82247 BILIRUBIN TOTAL: CPT | Performed by: EMERGENCY MEDICINE

## 2024-11-23 PROCEDURE — 84443 ASSAY THYROID STIM HORMONE: CPT | Performed by: EMERGENCY MEDICINE

## 2024-11-23 PROCEDURE — 82435 ASSAY OF BLOOD CHLORIDE: CPT | Performed by: EMERGENCY MEDICINE

## 2024-11-23 PROCEDURE — 85048 AUTOMATED LEUKOCYTE COUNT: CPT | Performed by: EMERGENCY MEDICINE

## 2024-11-23 PROCEDURE — 82248 BILIRUBIN DIRECT: CPT | Performed by: EMERGENCY MEDICINE

## 2024-11-23 PROCEDURE — 99285 EMERGENCY DEPT VISIT HI MDM: CPT | Mod: 25

## 2024-11-23 PROCEDURE — 80053 COMPREHEN METABOLIC PANEL: CPT | Performed by: EMERGENCY MEDICINE

## 2024-11-23 PROCEDURE — 93005 ELECTROCARDIOGRAM TRACING: CPT

## 2024-11-23 PROCEDURE — 80048 BASIC METABOLIC PNL TOTAL CA: CPT | Performed by: EMERGENCY MEDICINE

## 2024-11-23 PROCEDURE — 82077 ASSAY SPEC XCP UR&BREATH IA: CPT | Performed by: EMERGENCY MEDICINE

## 2024-11-23 PROCEDURE — 83735 ASSAY OF MAGNESIUM: CPT | Performed by: EMERGENCY MEDICINE

## 2024-11-23 PROCEDURE — 70450 CT HEAD/BRAIN W/O DYE: CPT

## 2024-11-23 PROCEDURE — 84075 ASSAY ALKALINE PHOSPHATASE: CPT | Performed by: EMERGENCY MEDICINE

## 2024-11-23 PROCEDURE — 82550 ASSAY OF CK (CPK): CPT | Performed by: EMERGENCY MEDICINE

## 2024-11-23 PROCEDURE — 82565 ASSAY OF CREATININE: CPT | Performed by: EMERGENCY MEDICINE

## 2024-11-23 ASSESSMENT — COLUMBIA-SUICIDE SEVERITY RATING SCALE - C-SSRS
1. IN THE PAST MONTH, HAVE YOU WISHED YOU WERE DEAD OR WISHED YOU COULD GO TO SLEEP AND NOT WAKE UP?: NO
6. HAVE YOU EVER DONE ANYTHING, STARTED TO DO ANYTHING, OR PREPARED TO DO ANYTHING TO END YOUR LIFE?: NO
2. HAVE YOU ACTUALLY HAD ANY THOUGHTS OF KILLING YOURSELF IN THE PAST MONTH?: NO

## 2024-11-23 ASSESSMENT — ACTIVITIES OF DAILY LIVING (ADL)
ADLS_ACUITY_SCORE: 0

## 2024-11-23 NOTE — ED PROVIDER NOTES
Emergency Department Note      History of Present Illness     Chief Complaint   Seizures    HPI   Anna Valdovinos is a 28 year old female with a history of anxiety, depression, and OCD who presents for evaluation of seizures. She reports that her seizure like episodes started in 2020 and since then it has been intermittent where she would have seizure like episodes everyday and sometimes no seizures for a week. She reports that her frequency somewhat increases with stress. Last night, she had 4 seizure episodes after having a boiled egg. She threw up 5 minutes after having an egg which was followed by witnessed seizure-like activity. She also reports he sustained 3 seizure like activities earlier today as well. She denies any head trauma or loss of consciousness. She also reports that she has been in increased stress lately regarding a job loss and cat related issues. She reports that her seizure episodes are sometimes associated with confusion after that however most of the time she is aware of the surroundings and states it lasts for few minutes, longest being 3 minutes.  She describes the seizure-like activity as generalized shaking and back arching and feels tired afterwards. She reports occasional caffeine use and denies any active alcohol use, last drink being 3 years ago.  No history of alcohol withdrawal seizures.  She denies any headache, neck pain, vision changes, numbness, weakness, chest pain, shortness of breath, abdominal pain, or GI/ symptoms.      Independent Historian   None    Review of External Notes   I reviewed the ED note from 09/25/2024    Past Medical History     Medical History and Problem List   Asthma  Depression  OCD  Anxiety     Medications   Zofran  Albuterol  Keflex  Prednisone  Cymbalta  Minipress     Surgical History   Tympanostomy    Physical Exam     Patient Vitals for the past 24 hrs:   BP Temp Temp src Pulse Resp SpO2 Height Weight   11/23/24 1853 -- -- -- 87 21 -- -- --  "  11/23/24 1850 123/85 -- -- 109 16 98 % -- --   11/23/24 1800 -- -- -- 101 10 98 % -- --   11/23/24 1600 -- -- -- 85 14 98 % -- --   11/23/24 1520 118/77 98.3  F (36.8  C) Oral 90 18 100 % 1.549 m (5' 1\") 54.4 kg (120 lb)     Physical Exam  Constitutional:       General: Not in acute distress.     Appearance: Normal appearance  HENT:      Head: Normocephalic and atraumatic.      Mouth: No tongue abrasions or lacerations.  Eyes:     Extraocular Movements: Extraocular movements intact.      Conjunctiva/sclera: Conjunctivae normal.      Pupils: Pupils are equal, round, and reactive to light.   Cardiovascular:     Rate and Rhythm: Normal rate and regular rhythm.   Pulmonary:      Effort: Pulmonary effort is normal.      Breath sounds: Normal breath sounds.   Abdominal:      General: Abdomen is flat. There is no distension.      Palpations: Abdomen is soft.      Tenderness: There is no abdominal tenderness.   Musculoskeletal:      Cervical back: Normal range of motion and neck supple. No rigidity.      General: No swelling or deformity.   Skin:     General: Skin is warm and dry.   Neurological:      General: No focal deficit present.     Mental Status: Alert and oriented to person, place, and time.   Psychiatric:         Mood and Affect: Mood normal.         Behavior: Behavior normal.     Diagnostics     Lab Results   Labs Ordered and Resulted from Time of ED Arrival to Time of ED Departure   BASIC METABOLIC PANEL - Abnormal       Result Value    Sodium 140      Potassium 3.6      Chloride 106      Carbon Dioxide (CO2) 20 (*)     Anion Gap 14      Urea Nitrogen 11.5      Creatinine 0.76      GFR Estimate >90      Calcium 9.3      Glucose 103 (*)    ROUTINE UA WITH MICROSCOPIC REFLEX TO CULTURE - Abnormal    Color Urine Yellow      Appearance Urine Slightly Cloudy (*)     Glucose Urine Negative      Bilirubin Urine Negative      Ketones Urine >150 (*)     Specific Gravity Urine 1.030      Blood Urine Trace (*)     pH " Urine 6.0      Protein Albumin Urine 70 (*)     Urobilinogen Urine 2.0      Nitrite Urine Negative      Leukocyte Esterase Urine Trace (*)     Bacteria Urine Few (*)     Mucus Urine Present (*)     RBC Urine 25 (*)     WBC Urine 14 (*)     Squamous Epithelials Urine 10 (*)    ISTAT GASES LACTATE VENOUS POCT - Abnormal    Lactic Acid POCT <0.3      Bicarbonate Venous POCT 7 (*)     O2 Sat, Venous POCT 91 (*)     pCO2 Venous POCT 12 (*)     pH Venous POCT 7.36      pO2 Venous POCT 60 (*)     Base Excess/Deficit (+/-) POCT -18.0 (*)    ISTAT GASES LACTATE VENOUS POCT - Abnormal    Lactic Acid POCT 0.8      Bicarbonate Venous POCT 22      O2 Sat, Venous POCT 68 (*)     pCO2 Venous POCT 38 (*)     pH Venous POCT 7.37      pO2 Venous POCT 36      Base Excess/Deficit (+/-) POCT -3.0     URINE DRUG SCREEN PANEL - Abnormal    Amphetamines Urine Screen Negative      Barbituates Urine Screen Negative      Benzodiazepine Urine Screen Negative      Cannabinoids Urine Screen Positive (*)     Cocaine Urine Screen Negative      Fentanyl Qual Urine Screen Negative      Opiates Urine Screen Negative      PCP Urine Screen Negative     HEPATIC FUNCTION PANEL - Normal    Protein Total 7.8      Albumin 4.5      Bilirubin Total 0.4      Alkaline Phosphatase 69      AST 18      ALT 17      Bilirubin Direct <0.20     MAGNESIUM - Normal    Magnesium 2.2     TSH WITH FREE T4 REFLEX - Normal    TSH 1.03     CK TOTAL - Normal    CK 82     HCG QUALITATIVE PREGNANCY - Normal    hCG Serum Qualitative Negative     ETHYL ALCOHOL LEVEL - Normal    Alcohol ethyl <0.01     CBC WITH PLATELETS AND DIFFERENTIAL    WBC Count 10.2      RBC Count 4.44      Hemoglobin 12.4      Hematocrit 37.8      MCV 85      MCH 27.9      MCHC 32.8      RDW 13.7      Platelet Count 367      % Neutrophils 81      % Lymphocytes 15      % Monocytes 3      % Eosinophils 0      % Basophils 0      % Immature Granulocytes 0      NRBCs per 100 WBC 0      Absolute Neutrophils 8.3       Absolute Lymphocytes 1.6      Absolute Monocytes 0.3      Absolute Eosinophils 0.0      Absolute Basophils 0.0      Absolute Immature Granulocytes 0.0      Absolute NRBCs 0.0     URINE CULTURE     Imaging   CT Head w/o Contrast   Final Result   IMPRESSION:   1.  Normal head CT.        EKG   Normal sinus rhythm.  Rate of 82.  Normal AK and QRS.  Normal QTc.  No acute ST elevation or depression as compared with 3/15/2022 EKG  Interepreted by me at 1611    Independent Interpretation   CT Head: No intracranial hemorrhage.  ED Course      Medications Administered   Medications - No data to display    Procedures   Procedures     Discussion of Management   Neurology, Dr Zeinab Forde    ED Course   ED Course as of 11/23/24 2351   Sat Nov 23, 2024   1609 I obtained the history and examined the patient as above.    1611 EKG 12 lead  Normal sinus rhythm.  Rate of 82.  Normal AK and QRS.  Normal QTc.  No acute ST elevation or depression as compared with 3/15/2022 EKG.   1824 UA with Microscopic reflex to Culture(!)  Contaimnated sample   1838 I spoke with neurology Dr. Zeinab Forde from [facility] regarding this patient.    1838 I rechecked and updated the patient.     1839 Findings and plan explained to the Patient. Patient discharged home with instructions regarding supportive care, medications, and reasons to return. The importance of close follow-up was reviewed. She is amenable to follow up with outpatient neurology.     Additional Documentation  None    Medical Decision Making / Diagnosis     CMS Diagnoses: None    MIPS       None    MDM   Anna Valdovinos is a 28-year-old female presents to the emergency department for seizure-like activity.  Reports that she has multiple episodes a day in which she has generalized tremors in addition to arching of the back and stiffness lasting as long as 3 minutes at a time, but generally fully aware of the situation and subsequent relaxation and tiredness.   Patient reports that she has no clear diagnosis of seizure just yet as she was scheduled to follow-up with [facility] neurology, but appointments all got canceled after she was dismissed from her job and no longer has insurance.  Denies ever having an EEG completed.  On review of charts, it appears that there were some concerns in the past regarding psychogenic nonepileptic seizures.  Based on patient's report of no loss of consciousness and increased stressors in life recently and at times noticing increased frequency in these activities with stress, certainly possible these are psychogenic nonepileptic seizures.  Nonetheless, it is still possible to have a concurrent diagnosis of true seizures as well.  Will obtain seizure workup.  CT head for evaluation for recurrent seizure-like activity.  Evaluate for electrolyte derangements.  Consult general neurology.  Consideration of discharging with trial of low-dose p.o. Keppra and follow-up outpatient if no further episodes in the ED.  Discussed care plan with patient who voiced understanding and agreement with plan.  Answered all questions.  Additional workup and orders as listed in chart.     Ultimately, work up shows no acute findings on CT head.  Lab work unremarkable.  No evidence of pregnancy.  No evidence of lactic acidemia to suggest recent seizure episode.  No evidence of CK level to suggest myositis.  Urine drug screen positive for cannabinoids.  Urine analysis contaminated sample.  Given no urinary symptoms, will await urine culture.  Patient had no further episodes in the ER.  General neurology consulted recommended against initiation of Keppra and suggest outpatient follow-up in clinic.  Patient was advised to not drive for the next 3 months until cleared by outpatient neurology.  Discussed return precautions.  Answered all questions.  Patient voiced understanding and agreement with plan and comfortable with discharge home.      Please refer to ED course  above as part of continuation of MDM for details on the patient's treatment course and any potential changes or updates beyond my initial evaluation and MDM creation.     Disposition   The patient was discharged.     Diagnosis     ICD-10-CM    1. Seizure-like activity (H)  R56.9 ED To Primary Care Referral     Adult Neurology  Referral         Discharge Medications   Discharge Medication List as of 11/23/2024  6:49 PM        Scribe Disclosure:  Kai BERRY, am serving as a scribe at 3:39 PM on 11/23/2024 to document services personally performed by Dilip Acosta DO based on my observations and the provider's statements to me.        Dilip Acosta DO  11/23/24 4113

## 2024-11-23 NOTE — ED TRIAGE NOTES
4 seizures yesterday and 2 seizures today. Reports yesterday fiance witnessed seizure and reported it was tensing up extremities without full body movement. Never been diagnosed with seizures, not on medications.      Triage Assessment (Adult)       Row Name 11/23/24 1522          Triage Assessment    Airway WDL WDL        Respiratory WDL    Respiratory WDL WDL        Skin Circulation/Temperature WDL    Skin Circulation/Temperature WDL WDL        Cardiac WDL    Cardiac WDL WDL        Peripheral/Neurovascular WDL    Peripheral Neurovascular WDL X        Cognitive/Neuro/Behavioral WDL    Cognitive/Neuro/Behavioral WDL WDL

## 2024-11-23 NOTE — ED NOTES
Issue with istat gases sample that was collected at 1634 giving false results. New istat sample ran and MD made aware of new sample.

## 2024-11-24 NOTE — DISCHARGE INSTRUCTIONS
Please follow-up with your primary care provider and/or specialist regarding your visit to the ER today.    Please return to the emergency department should you experience any of the symptoms we specifically discussed, including but not limited to recurrence or worsening of your symptoms, or development of any new and concerning symptoms such as fever, chest pain, shortness of breath, or increase in frequency of your seizure-like activities.    Please do not drive for the next 3 months until clear by general neurologist.  I have made you an outpatient primary care doctor referral as well.  Please call above number for scheduling of outpatient follow-up with [facility] if you are unable to schedule with [facility] neurologist.

## 2024-11-25 LAB — BACTERIA UR CULT: NORMAL

## 2024-12-17 ENCOUNTER — HOSPITAL ENCOUNTER (EMERGENCY)
Facility: CLINIC | Age: 29
Discharge: HOME | End: 2024-12-17
Attending: EMERGENCY MEDICINE | Admitting: EMERGENCY MEDICINE

## 2024-12-17 ENCOUNTER — TRANSFERRED RECORDS (OUTPATIENT)
Dept: HEALTH INFORMATION MANAGEMENT | Facility: CLINIC | Age: 29
End: 2024-12-17

## 2024-12-17 VITALS
OXYGEN SATURATION: 99 % | DIASTOLIC BLOOD PRESSURE: 80 MMHG | RESPIRATION RATE: 14 BRPM | TEMPERATURE: 98.1 F | HEIGHT: 61 IN | HEART RATE: 98 BPM | SYSTOLIC BLOOD PRESSURE: 114 MMHG | BODY MASS INDEX: 23.6 KG/M2 | WEIGHT: 125 LBS

## 2024-12-17 DIAGNOSIS — R56.9 SEIZURE-LIKE ACTIVITY (H): Primary | ICD-10-CM

## 2024-12-17 DIAGNOSIS — R07.9 CHEST PAIN, UNSPECIFIED TYPE: ICD-10-CM

## 2024-12-17 LAB
ANION GAP SERPL CALCULATED.3IONS-SCNC: 12 MMOL/L (ref 7–15)
ATRIAL RATE - MUSE: 78 BPM
BASOPHILS # BLD AUTO: 0.1 10E3/UL (ref 0–0.2)
BASOPHILS NFR BLD AUTO: 1 %
BUN SERPL-MCNC: 11.1 MG/DL (ref 6–20)
CALCIUM SERPL-MCNC: 9.4 MG/DL (ref 8.8–10.4)
CHLORIDE SERPL-SCNC: 101 MMOL/L (ref 98–107)
CREAT SERPL-MCNC: 0.69 MG/DL (ref 0.51–0.95)
DIASTOLIC BLOOD PRESSURE - MUSE: NORMAL MMHG
EGFRCR SERPLBLD CKD-EPI 2021: >90 ML/MIN/1.73M2
EOSINOPHIL # BLD AUTO: 0 10E3/UL (ref 0–0.7)
EOSINOPHIL NFR BLD AUTO: 0 %
ERYTHROCYTE [DISTWIDTH] IN BLOOD BY AUTOMATED COUNT: 13.6 % (ref 10–15)
GLUCOSE SERPL-MCNC: 81 MG/DL (ref 70–99)
HCG SERPL QL: NEGATIVE
HCO3 SERPL-SCNC: 24 MMOL/L (ref 22–29)
HCT VFR BLD AUTO: 40.1 % (ref 35–47)
HGB BLD-MCNC: 12.9 G/DL (ref 11.7–15.7)
IMM GRANULOCYTES # BLD: 0 10E3/UL
IMM GRANULOCYTES NFR BLD: 0 %
INTERPRETATION ECG - MUSE: NORMAL
LYMPHOCYTES # BLD AUTO: 2.8 10E3/UL (ref 0.8–5.3)
LYMPHOCYTES NFR BLD AUTO: 34 %
MCH RBC QN AUTO: 27.4 PG (ref 26.5–33)
MCHC RBC AUTO-ENTMCNC: 32.2 G/DL (ref 31.5–36.5)
MCV RBC AUTO: 85 FL (ref 78–100)
MONOCYTES # BLD AUTO: 0.3 10E3/UL (ref 0–1.3)
MONOCYTES NFR BLD AUTO: 4 %
NEUTROPHILS # BLD AUTO: 5.2 10E3/UL (ref 1.6–8.3)
NEUTROPHILS NFR BLD AUTO: 61 %
NRBC # BLD AUTO: 0 10E3/UL
NRBC BLD AUTO-RTO: 0 /100
P AXIS - MUSE: 84 DEGREES
PLATELET # BLD AUTO: 406 10E3/UL (ref 150–450)
POTASSIUM SERPL-SCNC: 4.2 MMOL/L (ref 3.4–5.3)
PR INTERVAL - MUSE: 128 MS
QRS DURATION - MUSE: 76 MS
QT - MUSE: 350 MS
QTC - MUSE: 399 MS
R AXIS - MUSE: 80 DEGREES
RBC # BLD AUTO: 4.7 10E6/UL (ref 3.8–5.2)
SODIUM SERPL-SCNC: 137 MMOL/L (ref 135–145)
SYSTOLIC BLOOD PRESSURE - MUSE: NORMAL MMHG
T AXIS - MUSE: 61 DEGREES
TROPONIN T SERPL HS-MCNC: <6 NG/L
VENTRICULAR RATE- MUSE: 78 BPM
WBC # BLD AUTO: 8.5 10E3/UL (ref 4–11)

## 2024-12-17 PROCEDURE — 99284 EMERGENCY DEPT VISIT MOD MDM: CPT | Mod: 25

## 2024-12-17 PROCEDURE — 36415 COLL VENOUS BLD VENIPUNCTURE: CPT | Performed by: EMERGENCY MEDICINE

## 2024-12-17 PROCEDURE — 82435 ASSAY OF BLOOD CHLORIDE: CPT | Performed by: EMERGENCY MEDICINE

## 2024-12-17 PROCEDURE — 82565 ASSAY OF CREATININE: CPT | Performed by: EMERGENCY MEDICINE

## 2024-12-17 PROCEDURE — 93005 ELECTROCARDIOGRAM TRACING: CPT

## 2024-12-17 PROCEDURE — 84703 CHORIONIC GONADOTROPIN ASSAY: CPT | Performed by: EMERGENCY MEDICINE

## 2024-12-17 PROCEDURE — 85004 AUTOMATED DIFF WBC COUNT: CPT | Performed by: EMERGENCY MEDICINE

## 2024-12-17 PROCEDURE — 84484 ASSAY OF TROPONIN QUANT: CPT | Performed by: EMERGENCY MEDICINE

## 2024-12-17 PROCEDURE — 80048 BASIC METABOLIC PNL TOTAL CA: CPT | Performed by: EMERGENCY MEDICINE

## 2024-12-17 ASSESSMENT — COLUMBIA-SUICIDE SEVERITY RATING SCALE - C-SSRS
1. IN THE PAST MONTH, HAVE YOU WISHED YOU WERE DEAD OR WISHED YOU COULD GO TO SLEEP AND NOT WAKE UP?: NO
6. HAVE YOU EVER DONE ANYTHING, STARTED TO DO ANYTHING, OR PREPARED TO DO ANYTHING TO END YOUR LIFE?: YES
2. HAVE YOU ACTUALLY HAD ANY THOUGHTS OF KILLING YOURSELF IN THE PAST MONTH?: NO

## 2024-12-17 ASSESSMENT — ACTIVITIES OF DAILY LIVING (ADL)
ADLS_ACUITY_SCORE: 41

## 2024-12-17 NOTE — Clinical Note
[facility] EMERGENCY DEPT  [address] [address]  Phone: [phone]  Fax: [phone]    December 17, 2024        Anna Valdovinos  [address]    [place] [address]          To whom it may concern:    RE: Anna Valdovinos    {:[id number]}    Please contact me for questions or concerns.      Sincerely,      Merrill Orr MD

## 2024-12-17 NOTE — DISCHARGE INSTRUCTIONS
You could also consider [facility] as a second opinion if you don't want to f/up with [facility]    A  will reach out to you to discuss insurance issues.

## 2024-12-17 NOTE — LETTER
December 17, 2024      To Whom It May Concern:      Anna Valdovinos was seen in our Emergency Department today, 12/17/24.  I expect her condition to improve over the next 1-2 days.  She may return to work when improved.    Sincerely,        Kirby GERMAN RN

## 2024-12-17 NOTE — ED PROVIDER NOTES
Emergency Department Note      History of Present Illness     Chief Complaint   Seizures    HPI   Anna Valdovinos is a 29 year old female who presents for seizure-like activity. Patient has history of seizure-like activity and says that within the span of an hour this morning, she had at least 7 episodes that were witnessed by her partner and now she is having some chest pain and difficulty breathing. She thinks this is because they happened so fast in a row. The episodes lasted for a couple of minutes each and she says that she loses consciousness afterwards. She regains consciousness a couple of minutes after and her vision is hazy for a short period but she denies current double or blurry vision. Patient says that she bit her cheek this morning but did not break skin and this is the first time this has happened. These episodes have caused her to fall twice in the past. She says that she is often able to feel when these episodes are coming on. Patient reports that she had her first appointment with [facility] and MRI and EEG was ordered but she has not been able to get these done due to insurance issues and to loss of her job. She is currently on a wait list for an appointment with [facility] in [place]. Patient takes an edible gummy and THC drink at night before bed but has been doing this since before the seizure-like activity started. No history of diabetes, hypertension, or hyperlipidemia. She endorses history of depression and anxiety and does not having and mental health concerns today.     Independent Historian   None    Review of External Notes   Reviewed neurology note from 10/18. Patient was seen for seizure spells since 2021. MRI and EEG ordered. Patient was seen in the ED on 11/23 for seizure-like activity.    Past Medical History     Medical History and Problem List   Asthma  Allergic rhinitis   Benign heart murmur  Depression  Anxiety  PTSD     Medications  "  Cymbalta   Levonorgestrel-ethinyl     Surgical History   Tympanoplasty     Physical Exam     Patient Vitals for the past 24 hrs:   BP Temp Temp src Pulse Resp SpO2 Height Weight   12/17/24 1228 114/80 98.1  F (36.7  C) Oral 98 14 99 % 1.549 m (5' 1\") 56.7 kg (125 lb)     Physical Exam  General: Alert, appears well-developed and well-nourished. Cooperative.     In mild distress  HEENT:  Head:  Atraumatic  Ears:  External ears are normal  Mouth/Throat:  Oropharynx is without erythema or exudate and mucous membranes are moist.   Eyes:   Conjunctivae normal and EOM are normal. No scleral icterus.    Pupils are equal, round, and reactive to light.   CV:  Normal rate, regular rhythm, normal heart sounds and radial and dorsalis pedis pulses are 2+ and symmetric.  No murmur.  Resp:  Breath sounds are clear bilaterally    Non-labored, no retractions or accessory muscle use  GI:  Abdomen is soft, no distension, no tenderness. No rebound or guarding.  MS:  Normal range of motion. No edema.    Normal strength in all 4 extremities.     Back atraumatic.    No midline cervical, thoracic, or lumbar tenderness  Skin:  Warm and dry.  No rash or lesions noted.  Neuro:   Alert. Normal strength.  Sensation intact in all 4 extremities. GCS:     Cranial nerves 2-12 intact.  Psych: Normal mood and affect.    Diagnostics     Lab Results   Labs Ordered and Resulted from Time of ED Arrival to Time of ED Departure   BASIC METABOLIC PANEL - Normal       Result Value    Sodium 137      Potassium 4.2      Chloride 101      Carbon Dioxide (CO2) 24      Anion Gap 12      Urea Nitrogen 11.1      Creatinine 0.69      GFR Estimate >90      Calcium 9.4      Glucose 81     TROPONIN T, HIGH SENSITIVITY - Normal    Troponin T, High Sensitivity <6     HCG QUALITATIVE PREGNANCY - Normal    hCG Serum Qualitative Negative     CBC WITH PLATELETS AND DIFFERENTIAL    WBC Count 8.5      RBC Count 4.70      Hemoglobin 12.9      Hematocrit 40.1      MCV 85      " MCH 27.4      MCHC 32.2      RDW 13.6      Platelet Count 406      % Neutrophils 61      % Lymphocytes 34      % Monocytes 4      % Eosinophils 0      % Basophils 1      % Immature Granulocytes 0      NRBCs per 100 WBC 0      Absolute Neutrophils 5.2      Absolute Lymphocytes 2.8      Absolute Monocytes 0.3      Absolute Eosinophils 0.0      Absolute Basophils 0.1      Absolute Immature Granulocytes 0.0      Absolute NRBCs 0.0         Imaging   No orders to display     EKG   ECG taken at 1441, ECG read at 1524  Normal sinus rhythm   Right atrial enlargement   Borderline ECG    No significant change as compared to prior, dated 11/23/24.  Rate 78 bpm. NC interval 128 ms. QRS duration 76 ms. QT/QTc 350/399 ms. P-R-T axes 84 80 61.    Independent Interpretation   None    ED Course      Medications Administered   Medications - No data to display    Discussion of Management   Gianni Patterson.    ED Course   ED Course as of 12/17/24 2141 Tue Dec 17, 2024   1433 I evaluated the patient, obtained history, and performed a physical exam as detailed above.    1443 I spoke with Gianni from social work.    1650 I rechecked on the patient and explained the plan for discharge. They are comfortable with this plan.      Additional Documentation  None    Medical Decision Making / Diagnosis     CMS Diagnoses: None    MIPS   None    MDM   Anna Valdovinos is a 29 year old female who presents with ongoing seizure-like events.  Patient's symptoms seem very concerning for nonepileptic pseudoseizures.  Patient did follow-up with neurology in October 2024 but has not obtained the recommended MRI imaging of the brain or outpatient EEG.  She unfortunately lost her insurance and this has made it challenging for her to follow-up with neurology.  She is looking for a second opinion with a neurologist and we discussed that this would be done in the outpatient setting.  Thankfully today I see no evidence of electrolyte abnormality.  She is not  pregnant.  She was having some nonspecific chest discomfort and thankfully EKG shows no concerning ischemic changes nor arrhythmias and troponin is undetectable, low concern for ACS.  She has normal cardiac sounds and normal lung sounds bilaterally.  I do not feel that chest x-ray imaging or chest imaging is indicated today.  She is PERC negative and low suspicion for pulmonary embolism.  I did have social work reach out to the patient to help with long-term insurance issues.  Additionally have given her referral to an outpatient neurologist for further workup for these seizure-like activities.  Again I have a concern for pseudoseizures but patient does need further neurology clearance and evaluation.  She had CT imaging of her head performed in November which was unremarkable and no indication for repeat imaging today.  I did evaluate the patient's mental health baseline and although she does have a history of anxiety and depression she feels that this is baseline and unchanged.  She does not need a mental health crisis evaluation today.  She is encouraged to follow-up with a neurologist in the outpatient setting for further evaluation.  After return precautions understood and all questions answered, discharged home.    Disposition   The patient was discharged.     Diagnosis     ICD-10-CM    1. Seizure-like activity (H)  R56.9       2. Chest pain, unspecified type  R07.9          Discharge Medications   Discharge Medication List as of 12/17/2024  4:56 PM        Scribe Disclosure:  I, Talia Michaud, am serving as a scribe at 2:37 PM on 12/17/2024 to document services personally performed by Merrill Orr MD based on my observations and the provider's statements to me.        Merrill Orr MD  12/17/24 6245

## 2024-12-17 NOTE — ED TRIAGE NOTES
"Pt states she has chronic seizure-like activity. Pt's lunaance told her that he \"stopped count after 6 seizures.\"    Pt states that she is on the neurology wait list but that is 6 months out.     In triage, patient states that she just feels out of it. Denies hitting her head. States that she did bite her cheek.      "

## 2024-12-18 NOTE — PROGRESS NOTES
Care Management Follow Up    Length of Stay (days): 0    Expected Discharge Date:       Concerns to be Addressed:       Patient plan of care discussed at interdisciplinary rounds: No    Anticipated Discharge Disposition:                Anticipated Discharge Services:    Anticipated Discharge DME:      Patient/family educated on Medicare website which has current facility and service quality ratings:    Education Provided on the Discharge Plan:    Patient/Family in Agreement with the Plan:      Referrals Placed by CM/SW:    Private pay costs discussed: Not applicable    Discussed  Partnership in Safe Discharge Planning  document with patient/family: No     Handoff Completed: No, handoff not indicated or clinically appropriate    Additional Information:   received notice from ED physician that patient does not currently have insurance due to job loss. Physician requested  follow up regarding this to see if assistance can be provided. Writer forwarded request via message to Financial Assistance team to see if they are able to connect with patient for next steps as they have already discharged. No further actions taken.    Next Steps: None as patient has discharged from hospital    PIPPA [place]  [facility]  Inpatient Care Management

## 2025-02-19 ENCOUNTER — HOSPITAL ENCOUNTER (INPATIENT)
Dept: NEUROLOGY | Facility: CLINIC | Age: 30
Discharge: HOME | DRG: 880 | End: 2025-02-19
Attending: PSYCHIATRY & NEUROLOGY

## 2025-02-19 ENCOUNTER — APPOINTMENT (OUTPATIENT)
Dept: MRI IMAGING | Facility: CLINIC | Age: 30
DRG: 880 | End: 2025-02-19
Attending: PSYCHIATRY & NEUROLOGY

## 2025-02-19 ENCOUNTER — HOSPITAL ENCOUNTER (INPATIENT)
Facility: CLINIC | Age: 30
LOS: 1 days | Discharge: HOME | End: 2025-02-20
Attending: EMERGENCY MEDICINE | Admitting: INTERNAL MEDICINE

## 2025-02-19 DIAGNOSIS — R56.9 SEIZURE-LIKE ACTIVITY (H): ICD-10-CM

## 2025-02-19 PROBLEM — J30.9 ALLERGIC RHINITIS: Status: ACTIVE | Noted: 2023-05-23

## 2025-02-19 PROBLEM — A60.04 HERPES SIMPLEX VULVOVAGINITIS: Status: ACTIVE | Noted: 2022-08-02

## 2025-02-19 PROBLEM — J45.909 ASTHMA: Status: ACTIVE | Noted: 2023-05-23

## 2025-02-19 PROBLEM — F39 EPISODIC MOOD DISORDER: Status: ACTIVE | Noted: 2019-10-01

## 2025-02-19 PROBLEM — F95.9 TIC: Status: ACTIVE | Noted: 2020-12-15

## 2025-02-19 LAB
ALBUMIN SERPL BCG-MCNC: 4.2 G/DL (ref 3.5–5.2)
ALP SERPL-CCNC: 71 U/L (ref 40–150)
ALT SERPL W P-5'-P-CCNC: 16 U/L (ref 0–50)
ANION GAP SERPL CALCULATED.3IONS-SCNC: 10 MMOL/L (ref 7–15)
AST SERPL W P-5'-P-CCNC: 30 U/L (ref 0–45)
BASOPHILS # BLD AUTO: 0.1 10E3/UL (ref 0–0.2)
BASOPHILS NFR BLD AUTO: 1 %
BILIRUB SERPL-MCNC: 0.2 MG/DL
BUN SERPL-MCNC: 11.9 MG/DL (ref 6–20)
CALCIUM SERPL-MCNC: 9.4 MG/DL (ref 8.8–10.4)
CHLORIDE SERPL-SCNC: 104 MMOL/L (ref 98–107)
CREAT SERPL-MCNC: 0.61 MG/DL (ref 0.51–0.95)
EGFRCR SERPLBLD CKD-EPI 2021: >90 ML/MIN/1.73M2
EOSINOPHIL # BLD AUTO: 0.1 10E3/UL (ref 0–0.7)
EOSINOPHIL NFR BLD AUTO: 2 %
ERYTHROCYTE [DISTWIDTH] IN BLOOD BY AUTOMATED COUNT: 14.4 % (ref 10–15)
GLUCOSE SERPL-MCNC: 93 MG/DL (ref 70–99)
HCG UR QL: NEGATIVE
HCO3 SERPL-SCNC: 26 MMOL/L (ref 22–29)
HCT VFR BLD AUTO: 38.5 % (ref 35–47)
HGB BLD-MCNC: 12.3 G/DL (ref 11.7–15.7)
IMM GRANULOCYTES # BLD: 0 10E3/UL
IMM GRANULOCYTES NFR BLD: 0 %
LACTATE SERPL-SCNC: 1.3 MMOL/L (ref 0.7–2)
LYMPHOCYTES # BLD AUTO: 2.3 10E3/UL (ref 0.8–5.3)
LYMPHOCYTES NFR BLD AUTO: 38 %
MAGNESIUM SERPL-MCNC: 2.1 MG/DL (ref 1.7–2.3)
MCH RBC QN AUTO: 27.3 PG (ref 26.5–33)
MCHC RBC AUTO-ENTMCNC: 31.9 G/DL (ref 31.5–36.5)
MCV RBC AUTO: 86 FL (ref 78–100)
MONOCYTES # BLD AUTO: 0.4 10E3/UL (ref 0–1.3)
MONOCYTES NFR BLD AUTO: 7 %
NEUTROPHILS # BLD AUTO: 3.2 10E3/UL (ref 1.6–8.3)
NEUTROPHILS NFR BLD AUTO: 53 %
NRBC # BLD AUTO: 0 10E3/UL
NRBC BLD AUTO-RTO: 0 /100
PLATELET # BLD AUTO: 389 10E3/UL (ref 150–450)
POTASSIUM SERPL-SCNC: 5.1 MMOL/L (ref 3.4–5.3)
PROT SERPL-MCNC: 7.7 G/DL (ref 6.4–8.3)
RBC # BLD AUTO: 4.5 10E6/UL (ref 3.8–5.2)
SODIUM SERPL-SCNC: 140 MMOL/L (ref 135–145)
WBC # BLD AUTO: 6 10E3/UL (ref 4–11)

## 2025-02-19 PROCEDURE — 120N000001 HC R&B MED SURG/OB

## 2025-02-19 PROCEDURE — 250N000013 HC RX MED GY IP 250 OP 250 PS 637: Performed by: INTERNAL MEDICINE

## 2025-02-19 PROCEDURE — 81025 URINE PREGNANCY TEST: CPT | Performed by: EMERGENCY MEDICINE

## 2025-02-19 PROCEDURE — 250N000013 HC RX MED GY IP 250 OP 250 PS 637: Performed by: NURSE PRACTITIONER

## 2025-02-19 PROCEDURE — 83605 ASSAY OF LACTIC ACID: CPT | Performed by: EMERGENCY MEDICINE

## 2025-02-19 PROCEDURE — 36415 COLL VENOUS BLD VENIPUNCTURE: CPT | Performed by: EMERGENCY MEDICINE

## 2025-02-19 PROCEDURE — 999N000052 EEG VIDEO 12-26 HR UNMONITORED

## 2025-02-19 PROCEDURE — 80053 COMPREHEN METABOLIC PANEL: CPT | Performed by: EMERGENCY MEDICINE

## 2025-02-19 PROCEDURE — 83735 ASSAY OF MAGNESIUM: CPT | Performed by: EMERGENCY MEDICINE

## 2025-02-19 PROCEDURE — A9585 GADOBUTROL INJECTION: HCPCS | Performed by: PSYCHIATRY & NEUROLOGY

## 2025-02-19 PROCEDURE — 255N000002 HC RX 255 OP 636: Performed by: PSYCHIATRY & NEUROLOGY

## 2025-02-19 PROCEDURE — 99285 EMERGENCY DEPT VISIT HI MDM: CPT | Mod: 25

## 2025-02-19 PROCEDURE — 70553 MRI BRAIN STEM W/O & W/DYE: CPT

## 2025-02-19 PROCEDURE — 84145 PROCALCITONIN (PCT): CPT | Performed by: HOSPITALIST

## 2025-02-19 PROCEDURE — 95714 VEEG EA 12-26 HR UNMNTR: CPT

## 2025-02-19 PROCEDURE — 99207 PR APP CREDIT; MD BILLING SHARED VISIT: CPT | Mod: FS | Performed by: NURSE PRACTITIONER

## 2025-02-19 PROCEDURE — 85025 COMPLETE CBC W/AUTO DIFF WBC: CPT | Performed by: EMERGENCY MEDICINE

## 2025-02-19 RX ORDER — ACETAMINOPHEN 650 MG/1
650 SUPPOSITORY RECTAL EVERY 4 HOURS PRN
Status: DISCONTINUED | OUTPATIENT
Start: 2025-02-19 | End: 2025-02-20 | Stop reason: HOSPADM

## 2025-02-19 RX ORDER — ARIPIPRAZOLE 5 MG/1
1 TABLET ORAL DAILY
COMMUNITY
Start: 2025-02-11

## 2025-02-19 RX ORDER — PROCHLORPERAZINE MALEATE 10 MG
10 TABLET ORAL EVERY 6 HOURS PRN
Status: DISCONTINUED | OUTPATIENT
Start: 2025-02-19 | End: 2025-02-20 | Stop reason: HOSPADM

## 2025-02-19 RX ORDER — AMOXICILLIN 250 MG
1 CAPSULE ORAL 2 TIMES DAILY PRN
Status: DISCONTINUED | OUTPATIENT
Start: 2025-02-19 | End: 2025-02-20 | Stop reason: HOSPADM

## 2025-02-19 RX ORDER — ONDANSETRON 2 MG/ML
4 INJECTION INTRAMUSCULAR; INTRAVENOUS EVERY 6 HOURS PRN
Status: DISCONTINUED | OUTPATIENT
Start: 2025-02-19 | End: 2025-02-20 | Stop reason: HOSPADM

## 2025-02-19 RX ORDER — LEVOCETIRIZINE DIHYDROCHLORIDE 5 MG/1
5 TABLET, FILM COATED ORAL EVERY EVENING
Status: DISCONTINUED | OUTPATIENT
Start: 2025-02-19 | End: 2025-02-20 | Stop reason: HOSPADM

## 2025-02-19 RX ORDER — ACETAMINOPHEN 325 MG/1
650 TABLET ORAL EVERY 4 HOURS PRN
Status: DISCONTINUED | OUTPATIENT
Start: 2025-02-19 | End: 2025-02-20 | Stop reason: HOSPADM

## 2025-02-19 RX ORDER — AMOXICILLIN 250 MG
2 CAPSULE ORAL 2 TIMES DAILY PRN
Status: DISCONTINUED | OUTPATIENT
Start: 2025-02-19 | End: 2025-02-20 | Stop reason: HOSPADM

## 2025-02-19 RX ORDER — LEVOCETIRIZINE DIHYDROCHLORIDE 5 MG/1
5 TABLET, FILM COATED ORAL EVERY EVENING
COMMUNITY

## 2025-02-19 RX ORDER — ARIPIPRAZOLE 2 MG/1
1 TABLET ORAL
COMMUNITY
Start: 2025-02-06 | End: 2025-02-19

## 2025-02-19 RX ORDER — GADOBUTROL 604.72 MG/ML
6 INJECTION INTRAVENOUS ONCE
Status: COMPLETED | OUTPATIENT
Start: 2025-02-19 | End: 2025-02-19

## 2025-02-19 RX ORDER — ONDANSETRON 4 MG/1
4 TABLET, ORALLY DISINTEGRATING ORAL EVERY 6 HOURS PRN
Status: DISCONTINUED | OUTPATIENT
Start: 2025-02-19 | End: 2025-02-20 | Stop reason: HOSPADM

## 2025-02-19 RX ORDER — LIDOCAINE 40 MG/G
CREAM TOPICAL
Status: DISCONTINUED | OUTPATIENT
Start: 2025-02-19 | End: 2025-02-20 | Stop reason: HOSPADM

## 2025-02-19 RX ORDER — DULOXETIN HYDROCHLORIDE 30 MG/1
120 CAPSULE, DELAYED RELEASE ORAL DAILY
Status: DISCONTINUED | OUTPATIENT
Start: 2025-02-20 | End: 2025-02-20 | Stop reason: HOSPADM

## 2025-02-19 RX ORDER — CALCIUM CARBONATE 500 MG/1
1000 TABLET, CHEWABLE ORAL 4 TIMES DAILY PRN
Status: DISCONTINUED | OUTPATIENT
Start: 2025-02-19 | End: 2025-02-20 | Stop reason: HOSPADM

## 2025-02-19 RX ORDER — LAMOTRIGINE 25 MG/1
TABLET ORAL
COMMUNITY
Start: 2024-08-08 | End: 2025-02-19

## 2025-02-19 RX ORDER — HYDROXYZINE HYDROCHLORIDE 10 MG/1
10 TABLET, FILM COATED ORAL 3 TIMES DAILY PRN
Status: DISCONTINUED | OUTPATIENT
Start: 2025-02-19 | End: 2025-02-20 | Stop reason: HOSPADM

## 2025-02-19 RX ADMIN — LEVOCETIRIZINE DIHYDROCHLORIDE 5 MG: 5 TABLET ORAL at 21:17

## 2025-02-19 RX ADMIN — GADOBUTROL 6 ML: 604.72 INJECTION INTRAVENOUS at 11:26

## 2025-02-19 RX ADMIN — ACETAMINOPHEN 650 MG: 325 TABLET, FILM COATED ORAL at 21:21

## 2025-02-19 RX ADMIN — HYDROXYZINE HYDROCHLORIDE 10 MG: 10 TABLET ORAL at 22:16

## 2025-02-19 ASSESSMENT — ACTIVITIES OF DAILY LIVING (ADL)
ADLS_ACUITY_SCORE: 41
ADLS_ACUITY_SCORE: 42
ADLS_ACUITY_SCORE: 41
ADLS_ACUITY_SCORE: 42
ADLS_ACUITY_SCORE: 41

## 2025-02-19 NOTE — CONSULTS
Neurology Consultation    Anna Valdovinos MRN# [id number]   YOB: 1995 Age: 29 year old    Code Status:Full Code   Date of Admission: 2/19/2025  Date of Consult:02/19/2025                                                                                       Assessment and Plan:                                         #.  Recurrent episodes of loss/alteration of consciousness, shaking and unresponsiveness.  Description of the events (without tongue trauma or loss of bowel/bladder control) is most concerning for nonepileptic/psychogenic events.  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  Differential diagnosis would include epileptic events, CNS disease.    History of these events has been present since 2019 but has significantly escalated in the last few days.  Plan for evaluation discussed with Dr. Bhtaia, ED physician--MRI scan of the brain is normal  Plan long-term video EEG to capture events.  Reports that medication was started 3 months ago by psychiatry-if this is not one of the medications on her current list, this may need to be investigated further    Discussed the possible diagnoses with the patient.  Follow-up after video EEG/hopefully will capture events to further characterize    If patient requires outpatient follow-up with neurology, she will return to Dr. Shirley Anaya would last seen her in October 2024            ----------------------------------------------------------------------------------  ----------------------------------------------------------------------------------  Reason for consult: as above       Chief Complaint:   Seizures           History of Present Illness:   This patient is a 29 year old female who presents with recurrent events involving loss or alteration of consciousness.  She was recently evaluated by my colleague Dr. Shirley Anaya 10/18/2024.  It was recommended that she complete MRI  "scan and EEG-these were deferred because of insurance coverage.  She has had several visits to the emergency room in the last few months for \"seizures\"  She reports that she has had recurrent events since 2019 that she temporarily relates to receiving the COVID vaccination  She reports that over the last year plus she has had daily episodes involving alteration or loss of consciousness-at least 1/day.  Some of them involved alteration of awareness, loss of consciousness, involuntary movement.  Her fiancé was interviewed separately and reports he has observed her to have unresponsiveness with eyes open and closed.    Her episodes had increased significantly in the last week and she had an episode last evening at work that lasted 9 minutes.  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  Her fiancé brought her to the emergency room this morning.    She has been under the care of psychiatry-she is on Abilify and duloxetine-she is unclear of the name but reports a new medicine was started 2 to 3 months ago.    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  She also reports a lot of stress related to wedding planning-she was engaged in December.        MEDICAL DOCUMENTATION REVIEWED:           Past Medical History:   History reviewed. No pertinent past medical history.      Past Surgical History:   History reviewed. No pertinent surgical history.       Social History:     Social History     Socioeconomic History    Marital status: Single     Spouse name: None    Number of children: None    Years of education: None    Highest education level: None   Tobacco Use    Smoking status: Never    Smokeless tobacco: Never   Substance and Sexual Activity    Alcohol use: Never    Drug use: Never     Social Drivers of Health "     Financial Resource Strain: High Risk (9/16/2024)    Financial Resource Strain     Within the past 12 months, have you or your family members you live with been unable to get utilities (heat, electricity) when it was really needed?: Yes   Food Insecurity: High Risk (9/16/2024)    Food Insecurity     Within the past 12 months, did you worry that your food would run out before you got money to buy more?: Yes     Within the past 12 months, did the food you bought just not last and you didn t have money to get more?: Yes   Transportation Needs: Low Risk  (9/16/2024)    Transportation Needs     Within the past 12 months, has lack of transportation kept you from medical appointments, getting your medicines, non-medical meetings or appointments, work, or from getting things that you need?: No    [facility], [facility]    Social Connections   Interpersonal Safety: Not At Risk (9/25/2024)    Received from [facility]     Humiliation, Afraid, Rape, and Kick questionnaire     Fear of Current or Ex-Partner: No     Emotionally Abused: No     Physically Abused: No     Sexually Abused: No   Housing Stability: High Risk (9/16/2024)    Housing Stability     Do you have housing? : No     Are you worried about losing your housing?: No     Reports herself is a recovering alcoholic, denies smoking      Family History:   History reviewed. No pertinent family history.  Reviewed and not felt to be contributory.        Home Medications:     Prior to Admission Medications   Prescriptions Last Dose Informant Patient Reported? Taking?   ARIPiprazole (ABILIFY) 5 MG tablet More than a month  Yes Yes   Sig: Take 1 tablet by mouth daily.   Patient taking differently: Take 1 tablet by mouth as needed (when over exerting herself to prevent asthma).   DULoxetine (CYMBALTA) 60 MG capsule 2/19/2025 Morning  Yes Yes   Sig: Take 120 mg by mouth daily.    levocetirizine (XYZAL) 5 MG tablet 2/18/2025  Yes Yes   Sig: Take 5 mg by mouth every evening.   levonorgestrel-ethinyl estradiol (AVIANE) 0.1-20 MG-MCG tablet 2/18/2025 Evening  No Yes   Sig: Take 1 tablet by mouth daily.      Facility-Administered Medications: None          Allergy:     Allergies   Allergen Reactions    Cefprozil Hives    Latex Rash          Inpatient Medications:   Scheduled Meds:  Current Facility-Administered Medications   Medication Dose Route Frequency Provider Last Rate Last Admin    sodium chloride (PF) 0.9% PF flush 3 mL  3 mL Intracatheter Q8H Norma Hoff APRN CNP         PRN Meds:   Current Facility-Administered Medications   Medication Dose Route Frequency Provider Last Rate Last Admin    acetaminophen (TYLENOL) tablet 650 mg  650 mg Oral Q4H PRN Norma Hoff APRN CNP        Or    acetaminophen (TYLENOL) Suppository 650 mg  650 mg Rectal Q4H PRN Norma Hoff APRN CNP        calcium carbonate (TUMS) chewable tablet 1,000 mg  1,000 mg Oral 4x Daily PRN Norma Hoff APRN CNP        lidocaine (LMX4) cream   Topical Q1H PRN Norma Hoff APRN CNP        lidocaine 1 % 0.1-1 mL  0.1-1 mL Other Q1H PRN Norma Hoff APRN CNP        ondansetron (ZOFRAN ODT) ODT tab 4 mg  4 mg Oral Q6H PRN Norma Hoff APRN CNP        Or    ondansetron (ZOFRAN) injection 4 mg  4 mg Intravenous Q6H PRN Norma Hoff APRN CNP        prochlorperazine (COMPAZINE) injection 10 mg  10 mg Intravenous Q6H PRN Norma Hoff APRN CNP        Or    prochlorperazine (COMPAZINE) tablet 10 mg  10 mg Oral Q6H PRN Norma Hoff APRN CNP        senna-docusate (SENOKOT-S/PERICOLACE) 8.6-50 MG per tablet 1 tablet  1 tablet Oral BID PRN Norma Hoff APRN CNP        Or    senna-docusate (SENOKOT-S/PERICOLACE) 8.6-50 MG per tablet 2 tablet  2 tablet Oral BID PRN Norma Hoff APRN CNP        sodium chloride (PF) 0.9% PF flush 3 mL  3 mL Intracatheter q1 min prn  Norma Hoff APRN CNP                 Physical Exam:   Physical Exam   Vitals:  Height:Data Unavailable  Weight:0 lbs 0 oz   Temp: 98.4  F (36.9  C) Temp src: Temporal BP: 125/77 Pulse: 90   Resp: 20 SpO2: 98 %      General Appearance: No acute distress, normal body habitus  Neuro Exam:  Mental Status Exam: Alert and oriented. Language and speech intact. Fund of knowledge normal.  Cranial Nerves: Vision/visual fields intact to finger counting. Pupils are equal and reactive to light. Extraocular movements intact. Facial strength and sensation is normal. No jaw or tongue deviation.  Motor: Motor tone and bulk are intact in extremities.   Reflexes: Symmetrically intact. Plantar signs normal.  Sensory: Vibration and cold intact in all 4 extremities.  Coordination: Coordination intact.  Gait: Not tested on cart  Neck: No nuchal rigidity  Extremities: No clubbing, no cyanosis, no edema          Data:   ROUTINE IP LABS   CBC RESULTS:     Recent Labs   Lab 02/19/25  1041   WBC 6.0   RBC 4.50   HGB 12.3   HCT 38.5        Basic Metabolic Panel:   Recent Labs   Lab Test 02/19/25  1041 12/17/24  1523 11/23/24  1538    137 140   POTASSIUM 5.1 4.2 3.6   CHLORIDE 104 101 106   CO2 26 24 20*   BUN 11.9 11.1 11.5   CR 0.61 0.69 0.76   GLC 93 81 103*   DAVID 9.4 9.4 9.3     Liver panel:  Recent Labs   Lab Test 02/19/25  1041 11/23/24  1538 08/18/24  1450 05/03/24 2020 03/27/24  0353   PROTTOTAL 7.7 7.8 8.5* 7.7 7.8   ALBUMIN 4.2 4.5 4.7 4.3 4.6   BILITOTAL 0.2 0.4 0.5 0.3 0.3   ALKPHOS 71 69 93 57 62   AST 30 18 18 29 16   ALT 16 17 14 13 12     Thyroid Panel:  Recent Labs   Lab Test 11/23/24  1538   TSH 1.03             IMAGING:   Independent interpretation of the following studies by myself as part of today's encounter.-Normal  MR BRAIN W/O and W CONTRAST  LOCATION: [facility]  DATE: 2/19/2025     INDICATION: recurrent episode of loc AOC transient neuro events r o seizure.  COMPARISON:  11/23/2024  CONTRAST: 6 mL Gadavist  TECHNIQUE: Routine multiplanar multisequence head MRI without and with intravenous contrast.     FINDINGS:  INTRACRANIAL CONTENTS: No acute or subacute infarct. No mass, acute hemorrhage, or extra-axial fluid collections. Normal brain parenchymal signal. Normal ventricles and sulci. Normal position of the cerebellar tonsils. No pathologic contrast enhancement.     SELLA: No abnormality accounting for technique.     OSSEOUS STRUCTURES/SOFT TISSUES: Normal marrow signal. The major intracranial vascular flow voids are maintained.      ORBITS: No abnormality accounting for technique.      SINUSES/MASTOIDS: No paranasal sinus mucosal disease. No middle ear or mastoid effusion.                                                                       IMPRESSION:     1.  No acute intracranial pathology.  2.  No abnormal intracranial enhancement.        Radha Alanis M.D.  [facility]  Office [phone]

## 2025-02-19 NOTE — ED TRIAGE NOTES
"Pt reports she has been having 6  episodes of seizure like activity per day.  Pt describes these events as her \"tensing up\" and \"drooling\", pt states she has videos of her recorded seizures on her phone.  Pt states she has seen neurology for this problem in the past.       Triage Assessment (Adult)       Row Name 02/19/25 0955 02/19/25 0954       Triage Assessment    Airway WDL WDL WDL       Respiratory WDL    Respiratory WDL WDL --       Skin Circulation/Temperature WDL    Skin Circulation/Temperature WDL WDL --       Cardiac WDL    Cardiac WDL WDL --       Peripheral/Neurovascular WDL    Peripheral Neurovascular WDL WDL --       Cognitive/Neuro/Behavioral WDL    Cognitive/Neuro/Behavioral WDL X  see notes --                    "

## 2025-02-19 NOTE — H&P
[facility]    History and Physical - Hospitalist Service       Date of Admission:  2/19/2025    Assessment & Plan      Anna Valdovinos is a 29 year old female admitted on 2/19/2025. She has a past medical history significant for anxiety, depression, PTSD and asthma. She presented to the emergency department today for evaluation of 7-8 episodes of seizure like activity over the last 12 hours.     She reports that these episodes, which started after receiving the COVID-19 vaccine in 2020, are associated with posterior headaches and occasional blurred spots or floaters in her vision. They have recently (as of one week ago) increased in frequency which she believes is possibly correlated to starting a new job. She reports 7-8 episodes since last night, with the longest episode lasting 9 minutes. She did not have any loss of consciousness or head trauma. No postictal period, denies loss of bladder or bowel. States that she returns to baseline quickly, however she does report some prolonged fatigue following these spells. She denies any chest pain, shortness of breath, nausea or vomiting. She denies any recent illnesses.     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 Lactic acid 1.3. Negative HCG.   *Has previously seen Dr. Thomas of [facility], EEG and MRI brain ordered, however patient was unable to complete due to insurance issues.   *Has not previously taken anti-seizure medication   - Admit to inpatient given the need for prolonged EEG  - Neurology consulted by the ED  - MRI brain with no acute intracranial pathology and no abnormal intracranial enhancement  - Prolonged EEG study per neurology   - Additional workup to neurology   - Maintain seizure precautions   - Fall precautions     Anxiety/Depression   PTSD  *Has established psychiatrist   -  Continue PTA duloxetine     Prior ETOH abuse  *Sober for over 3 years now.   - Takes THC gummies for sleep, notes that these episodes do not occur around bedtime.           Diet: Combination Diet Regular Diet Adult  DVT Prophylaxis: Low Risk/Ambulatory with no VTE prophylaxis indicated and Ambulate every shift  Cavazos Catheter: Not present  Lines: None     Cardiac Monitoring: None  Code Status: Full Code    Clinically Significant Risk Factors Present on Admission                                 # Asthma: noted on problem list        Disposition Plan     Medically Ready for Discharge: Anticipated in 1-2 days, pending further neuro workup.         The patient's care was discussed with the Attending Physician, Dr. Arechiga, Patient, and Patient's Family.    JOSE Hammond [facility]  Hospitalist Service  [facility]  Securely message with [employer] (more info)  Text page via [facility] Paging/Directory     ______________________________________________________________________    Chief Complaint   Seizure like activity     History is obtained from the patient, electronic health record, and emergency department physician    History of Present Illness   Anna Valdovinos is a 29 year old female admitted on 2/19/2025. She has a past medical history significant for anxiety, depression, PTSD and asthma. She presented to the emergency department today for evaluation of 7-8 episodes of seizure like activity over the last 12 hours.     She reports that these episodes, which started after receiving the COVID-19 vaccine in 2020, are associated with posterior headaches and occasional blurred spots or floaters in her vision. They have recently (as of one week ago) increased in frequency which she believes is possibly correlated to starting a new job. She reports 7-8 episodes since last night, with the longest episode lasting 9 minutes. Typically the episodes last only 1-2 minutes. She did not have any loss of  "consciousness or head trauma. No postictal period, denies loss of bladder or bowel. States that she returns to baseline quickly, however she does report some prolonged fatigue following these spells. She has previously been seen as an outpatient at [facility] by Dr. Thomas. An EEG and MRI brain were ordered, however she was unable to get these done with to insurance issues after she lost her job. She denies any chest pain, shortness of breath, nausea or vomiting. She denies any recent illnesses. She takes an edible gummy and THC drink before bed, but denies any seizure like activity around bedtime. She notes that these episodes of seizure-like activity started before she started taking these.     The patient is a recovering alcoholic, now 3 years sober. 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 She reports taking care of a client who drinks heavily and this has been stressful for her. She reports having a very strong support system and does not feel the urge to drink or use illicit drugs, however there is increased stress due to this environment which she feels correlates to the increased number of \"episodes\" she has been having recently. She resides with her fiance, who is at the bedside today.       Past Medical History    History reviewed. No pertinent past medical history.    Past Surgical History   History reviewed. No pertinent surgical history.    Prior to Admission Medications   Prior to Admission Medications   Prescriptions Last Dose Informant Patient Reported? Taking?   ARIPiprazole (ABILIFY) 5 MG tablet More than a month  Yes Yes   Sig: Take 1 tablet by mouth daily.   Patient taking differently: Take 1 tablet by mouth as needed (when over exerting herself to prevent asthma).   DULoxetine (CYMBALTA) 60 MG capsule 2/19/2025 Morning  Yes Yes   Sig: Take 120 mg by mouth daily.   levocetirizine (XYZAL) 5 MG tablet " 2/18/2025  Yes Yes   Sig: Take 5 mg by mouth every evening.   levonorgestrel-ethinyl estradiol (AVIANE) 0.1-20 MG-MCG tablet 2/18/2025 Evening  No Yes   Sig: Take 1 tablet by mouth daily.      Facility-Administered Medications: None        Review of Systems    The 10 point Review of Systems is negative other than noted in the HPI or here.     Social History   I have reviewed this patient's social history and updated it with pertinent information if needed.  Social History     Tobacco Use    Smoking status: Never    Smokeless tobacco: Never   Substance Use Topics    Alcohol use: Never    Drug use: Never         Family History     Unable to obtain due to: adopted      Allergies   Allergies   Allergen Reactions    Cefprozil Hives    Latex Rash        Physical Exam   Vital Signs: Temp: 98.4  F (36.9  C) Temp src: Temporal BP: 125/77 Pulse: 90   Resp: 20 SpO2: 98 %      Weight: 0 lbs 0 oz    Physical Exam  Vitals and nursing note reviewed.   Constitutional:       General: She is not in acute distress.     Appearance: Normal appearance.   HENT:      Nose: Nose normal.      Mouth/Throat:      Mouth: Mucous membranes are moist.   Eyes:      Pupils: Pupils are equal, round, and reactive to light.   Cardiovascular:      Rate and Rhythm: Normal rate and regular rhythm.      Pulses: Normal pulses.      Heart sounds: Normal heart sounds.   Pulmonary:      Effort: Pulmonary effort is normal.      Breath sounds: Normal breath sounds.   Abdominal:      General: There is no distension.      Palpations: Abdomen is soft.      Tenderness: There is no abdominal tenderness.   Musculoskeletal:         General: Normal range of motion.      Cervical back: Normal range of motion and neck supple.   Skin:     General: Skin is warm and dry.      Capillary Refill: Capillary refill takes less than 2 seconds.   Neurological:      General: No focal deficit present.      Mental Status: She is alert and oriented to person, place, and time.            Medical Decision Making       78 MINUTES SPENT BY ME on the date of service doing chart review, history, exam, documentation & further activities per the note.      Data     I have personally reviewed the following data over the past 24 hrs:    6.0  \   12.3   / 389     140 104 11.9 /  93   5.1 26 0.61 \     ALT: 16 AST: 30 AP: 71 TBILI: 0.2   ALB: 4.2 TOT PROTEIN: 7.7 LIPASE: N/A     Procal: N/A CRP: N/A Lactic Acid: 1.3         Imaging results reviewed over the past 24 hrs:   Recent Results (from the past 24 hours)   MR Brain w/o & w Contrast    Narrative    EXAM: MR BRAIN W/O and W CONTRAST  LOCATION: [facility]  DATE: 2/19/2025    INDICATION: recurrent episode of loc AOC transient neuro events r o seizure.  COMPARISON: 11/23/2024  CONTRAST: 6 mL Gadavist  TECHNIQUE: Routine multiplanar multisequence head MRI without and with intravenous contrast.    FINDINGS:  INTRACRANIAL CONTENTS: No acute or subacute infarct. No mass, acute hemorrhage, or extra-axial fluid collections. Normal brain parenchymal signal. Normal ventricles and sulci. Normal position of the cerebellar tonsils. No pathologic contrast enhancement.    SELLA: No abnormality accounting for technique.    OSSEOUS STRUCTURES/SOFT TISSUES: Normal marrow signal. The major intracranial vascular flow voids are maintained.     ORBITS: No abnormality accounting for technique.     SINUSES/MASTOIDS: No paranasal sinus mucosal disease. No middle ear or mastoid effusion.       Impression    IMPRESSION:    1.  No acute intracranial pathology.  2.  No abnormal intracranial enhancement.

## 2025-02-19 NOTE — PHARMACY-ADMISSION MEDICATION HISTORY
Pharmacy Intern Admission Medication History    Admission medication history is complete. The information provided in this note is only as accurate as the sources available at the time of the update.    Information Source(s): Patient and CareEverywhere/SureScripts via in-person    Pertinent Information: Patient reports taking her Abilify as needed when over exerting herself to help prevent an asthma attack.     Changes made to PTA medication list:  Added: Xyzal   Deleted: Lamictal  Changed: Abilify to prn and Zofran to not taking     Allergies reviewed with patient and updates made in EHR: yes    Medication History Completed By: Regla Levin 2/19/2025 10:48 AM    PTA Med List   Medication Sig Last Dose/Taking    ARIPiprazole (ABILIFY) 5 MG tablet Take 1 tablet by mouth daily. (Patient taking differently: Take 1 tablet by mouth as needed (when over exerting herself to prevent asthma).) More than a month    DULoxetine (CYMBALTA) 60 MG capsule Take 120 mg by mouth daily. 2/19/2025 Morning    levocetirizine (XYZAL) 5 MG tablet Take 5 mg by mouth every evening. 2/18/2025    levonorgestrel-ethinyl estradiol (AVIANE) 0.1-20 MG-MCG tablet Take 1 tablet by mouth daily. 2/18/2025 Evening

## 2025-02-19 NOTE — PROGRESS NOTES
RECEIVING UNIT ED HANDOFF REVIEW    ED Nurse Handoff Report was reviewed by: Hannah Kellogg RN on February 19, 2025 at 5:53 PM

## 2025-02-19 NOTE — ED NOTES
[facility]  ED Nurse Handoff Report    ED Chief complaint: Seizures      ED Diagnosis:   Final diagnoses:   None       Code Status: Full Code    Allergies:   Allergies   Allergen Reactions    Cefprozil Hives    Latex Rash       Patient Story: Pt comes in with several episodes of seizure like activity.  Focused Assessment:  Alert and oriented.  VSS.  Up independently in the room.  Fiance at bedside.  Has hx of seizure like activity but has been unable to follow up with neuro.    Treatments and/or interventions provided:   Patient's response to treatments and/or interventions:     To be done/followed up on inpatient unit:  See epic    Does this patient have any cognitive concerns?:     Activity level - Baseline/Home:  Independent  Activity Level - Current:   Stand with Assist    Patient's Preferred language: English   Needed?: No    Isolation: None  Infection: Not Applicable  Patient tested for COVID 19 prior to admission: NO  Bariatric?: No    Vital Signs:   Vitals:    02/19/25 0954 02/19/25 1000   BP: (!) 143/90    Pulse: 107    Resp: 20    Temp:  98.4  F (36.9  C)   TempSrc:  Temporal   SpO2: 100%        Cardiac Rhythm:     Was the PSS-3 completed:   Yes  What interventions are required if any?               Family Comments:   OBS brochure/video discussed/provided to patient/family: No              Name of person given brochure if not patient:               Relationship to patient:     For the majority of the shift this patient's behavior was Green.   Behavioral interventions performed were .    ED NURSE PHONE NUMBER: [id number]

## 2025-02-20 VITALS
SYSTOLIC BLOOD PRESSURE: 127 MMHG | RESPIRATION RATE: 18 BRPM | OXYGEN SATURATION: 99 % | TEMPERATURE: 98.7 F | DIASTOLIC BLOOD PRESSURE: 88 MMHG | HEART RATE: 109 BPM

## 2025-02-20 LAB — PROCALCITONIN SERPL IA-MCNC: <0.02 NG/ML

## 2025-02-20 PROCEDURE — 99239 HOSP IP/OBS DSCHRG MGMT >30: CPT | Performed by: HOSPITALIST

## 2025-02-20 PROCEDURE — 250N000013 HC RX MED GY IP 250 OP 250 PS 637: Performed by: NURSE PRACTITIONER

## 2025-02-20 PROCEDURE — 250N000013 HC RX MED GY IP 250 OP 250 PS 637: Performed by: INTERNAL MEDICINE

## 2025-02-20 RX ADMIN — DULOXETINE HYDROCHLORIDE 120 MG: 30 CAPSULE, DELAYED RELEASE ORAL at 09:24

## 2025-02-20 RX ADMIN — HYDROXYZINE HYDROCHLORIDE 10 MG: 10 TABLET ORAL at 11:16

## 2025-02-20 ASSESSMENT — ACTIVITIES OF DAILY LIVING (ADL)
ADLS_ACUITY_SCORE: 47
ADLS_ACUITY_SCORE: 47
ADLS_ACUITY_SCORE: 42
ADLS_ACUITY_SCORE: 47
ADLS_ACUITY_SCORE: 42
ADLS_ACUITY_SCORE: 47
ADLS_ACUITY_SCORE: 42
ADLS_ACUITY_SCORE: 47
ADLS_ACUITY_SCORE: 42
ADLS_ACUITY_SCORE: 47
ADLS_ACUITY_SCORE: 42
ADLS_ACUITY_SCORE: 42
ADLS_ACUITY_SCORE: 47
ADLS_ACUITY_SCORE: 47
ADLS_ACUITY_SCORE: 42
ADLS_ACUITY_SCORE: 42

## 2025-02-20 NOTE — PROGRESS NOTES
[facility]   [address] [place], [place] [address]  Hospitalist Service  Answering Service [phone]        2/20/2025          To whom it may concern,       Anna Valdovinos MRN# [id number]   YOB: 1995 Age: 29 year old       Date of Admission:  2/19/2025  Date of Discharge:  2/20/2025  Admitting Physician:  Cesar Arechiga, DO  Discharge Physician:  Krys Mora MD  Discharging Service:  Hospitalist  Primary Provider: No Ref-Primary, Physician    Restrictions:  No    Krys Mora MD

## 2025-02-20 NOTE — PLAN OF CARE
Reason for Admission: Seizure activities    Cognitive/Mentation: A/Ox 4  Neuros/CMS: Intact  VS: VSS on RA.   Tele: N/A.  GI/: Continent.  Pulmonary: LS clear.  Pain: Headache from EEG glue, managed with prn tylenol.     Drains/Lines: PIV SL  Skin: Intact. Reported itching new IV site, atarax given  Activity: SBA  Diet: Regular with thin liquids. Takes pills whole.     Therapies recs: Pending  Discharge: Pending    Aggression Stoplight Tool: Green    End of shift summary: Continuous EEG. Family at bedside. Continue with plan of care

## 2025-02-20 NOTE — CONSULTS
Neurology Daily Note      Admission Date:2/19/2025   Date of service: 02/20/2025   Hospital Day: 2                                                   Assessment and Plan:   #.  Recurrent episodes of loss/alteration of consciousness, shaking and unresponsiveness.  Description of the events (without tongue trauma or loss of bowel/bladder control) is most concerning for nonepileptic/psychogenic events.  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  Differential diagnosis would include epileptic events, CNS disease.     History of these events has been present since 2019 but has significantly escalated in the last few days.  MRI scan of the brain is normal  Plan long-term video EEG-marked event this morning at 939-patient mainly described a feeling of spasm in her extremity and face.  No change in EEG background to indicate seizure-overnight no seizure/electrographic seizure activity observed  Reports that medication was started 3 months ago by psychiatry-if this is not one of the medications on her current list, this may need to be investigated further  Plan to continue this video EEG till 2 PM and then discontinue    She already has plans to see psychologist for cognitive behavioral therapy in the next month.  This would be the most appropriate management of nonepileptic events  If patient requires outpatient follow-up with neurology, she will return to Dr. Shirley Anaya would last seen her in October 2024    Plan discussed with patient, nursing staff and EEG tech    Later- reviewed remaining EEG to 2pm- no additional events.   Ok to discharge patient with above plan.   Reviewed with patient.          Interval History:   Patient reports she had a brief episode last night at 930 and 1 in the middle of the night however those were not marked.  The event this morning at 939 was marked-no EEG correlate             Medications:   Scheduled Meds:  Current  Facility-Administered Medications   Medication Dose Route Frequency Provider Last Rate Last Admin    DULoxetine (CYMBALTA) DR capsule 120 mg  120 mg Oral Daily Norma Hoff APRN CNP   120 mg at 02/20/25 0924    levocetirizine (XYZAL) tablet 5 mg  5 mg Oral QPM Norma Hoff APRN CNP   5 mg at 02/19/25 2117    sodium chloride (PF) 0.9% PF flush 3 mL  3 mL Intracatheter Q8H Norma Hoff APRN CNP   3 mL at 02/19/25 2118     PRN Meds:   Current Facility-Administered Medications   Medication Dose Route Frequency Provider Last Rate Last Admin    acetaminophen (TYLENOL) tablet 650 mg  650 mg Oral Q4H PRN Norma Hoff APRN CNP   650 mg at 02/19/25 2121    Or    acetaminophen (TYLENOL) Suppository 650 mg  650 mg Rectal Q4H PRN Norma Hoff APRN CNP        calcium carbonate (TUMS) chewable tablet 1,000 mg  1,000 mg Oral 4x Daily PRN Norma Hoff APRN CNP        hydrOXYzine HCl (ATARAX) tablet 10 mg  10 mg Oral TID PRN Farrah Howard, DO   10 mg at 02/19/25 2216    lidocaine (LMX4) cream   Topical Q1H PRN Norma Hoff APRN CNP        lidocaine 1 % 0.1-1 mL  0.1-1 mL Other Q1H PRN Norma Hoff APRN CNP        ondansetron (ZOFRAN ODT) ODT tab 4 mg  4 mg Oral Q6H PRN Norma Hoff APRN CNP        Or    ondansetron (ZOFRAN) injection 4 mg  4 mg Intravenous Q6H PRN Norma Hoff APRN CNP        prochlorperazine (COMPAZINE) injection 10 mg  10 mg Intravenous Q6H PRN Norma Hoff APRN CNP        Or    prochlorperazine (COMPAZINE) tablet 10 mg  10 mg Oral Q6H PRN Norma Hoff APRN CNP        senna-docusate (SENOKOT-S/PERICOLACE) 8.6-50 MG per tablet 1 tablet  1 tablet Oral BID PRN Norma Hoff APRN CNP        Or    senna-docusate (SENOKOT-S/PERICOLACE) 8.6-50 MG per tablet 2 tablet  2 tablet Oral BID PRN Norma Hoff APRN CNP        sodium chloride (PF) 0.9% PF flush 3 mL  3 mL Intracatheter q1 min prn Norma Hoff APRN CNP               Physical  Exam:   Vitals: Temp: 98.7  F (37.1  C) Temp src: Oral BP: 127/88 Pulse: 109   Resp: 18 SpO2: 99 % O2 Device: None (Room air)    Vital Signs with Ranges: Temp:  [98.3  F (36.8  C)-99.6  F (37.6  C)] 98.7  F (37.1  C)  Pulse:  [] 109  Resp:  [16-18] 18  BP: (115-134)/(77-96) 127/88  SpO2:  [98 %-100 %] 99 %    General Appearance:  No acute distress  Neuro: Alert and oriented.  Language and speech intact                       Extremities: No clubbing, no cyanosis, no edema       Data:   ROUTINE IP LABS (Last 3results)  CBC RESULTS:     Recent Labs   Lab Test 02/19/25  1041 12/17/24  1523 11/23/24  1538   WBC 6.0 8.5 10.2   RBC 4.50 4.70 4.44   HGB 12.3 12.9 12.4   HCT 38.5 40.1 37.8    406 367     Basic Metabolic Panel:  Recent Labs   Lab Test 02/19/25  1041 12/17/24  1523 11/23/24  1538    137 140   POTASSIUM 5.1 4.2 3.6   CHLORIDE 104 101 106   CO2 26 24 20*   BUN 11.9 11.1 11.5   CR 0.61 0.69 0.76   GLC 93 81 103*   DAVID 9.4 9.4 9.3     Liver panel:  Recent Labs   Lab Test 02/19/25  1041 11/23/24  1538 08/18/24  1450 05/03/24 2020 03/27/24  0353   PROTTOTAL 7.7 7.8 8.5* 7.7 7.8   ALBUMIN 4.2 4.5 4.7 4.3 4.6   BILITOTAL 0.2 0.4 0.5 0.3 0.3   ALKPHOS 71 69 93 57 62   AST 30 18 18 29 16   ALT 16 17 14 13 12     Thyroid Panel:  Recent Labs   Lab Test 11/23/24  1538   TSH 1.03                  TIME     50minutes Evaluation/management time     Radha Alanis M.D.  Neurologist  [facility]  Office [phone]

## 2025-02-20 NOTE — PLAN OF CARE
Goal Outcome Evaluation:      Plan of Care Reviewed With: patient    Overall Patient Progress: no changeOverall Patient Progress: no change     Karol Meyer RN on 2/20/2025 at 4:01 PM     Reason for Admission: seizure like activity    Cognitive/Mentation: A/Ox 4  Neuros/CMS: Intact  VS: VSS on RA. HR a little high.  /GI: Continent.   Pulmonary: LS clear.  Pain: denies - itching from IV and EEG hairnet, PRN atarax given.     Skin: intact  Activity: SBS  Diet: reg with thin liquids. Takes pills whole.     Discharge: home, 2/20/2025    Aggression Stoplight Tool: green    End of shift summary: Pt had episode of tensing at 9:49. Discharge instructions reviewed with pt. Pt verbalized understanding.

## 2025-02-20 NOTE — DISCHARGE SUMMARY
[facility]    Discharge Summary  Hospitalist    Date of Admission:  2/19/2025  Date of Discharge:  2/20/2025    Discharge Diagnoses   Seizure-like activity (H)    History of Present Illness   Anna Valdovinos is an 29 year old female who presented with seizure-like activity, likely nonepileptic seizures    Hospital Course   Anna Valdovinos was admitted on 2/19/2025.  The following problems were addressed during her hospitalization:       Anna Valdovinos is a 29 year old female admitted on 2/19/2025. She has a past medical history significant for anxiety, depression, PTSD and asthma. She presented to the emergency department today for evaluation of 7-8 episodes of seizure like activity over the last 12 hours.     She reports that these episodes, which started after receiving the COVID-19 vaccine in 2020, are associated with posterior headaches and occasional blurred spots or floaters in her vision. They have recently (as of one week ago) increased in frequency which she believes is possibly correlated to starting a new job. She reports 7-8 episodes since last night, with the longest episode lasting 9 minutes. She did not have any loss of consciousness or head trauma. No postictal period, denies loss of bladder or bowel. States that she returns to baseline quickly, however she does report some prolonged fatigue following these spells. She denies any chest pain, shortness of breath, nausea or vomiting. She denies any recent illnesses.     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 Lactic acid 1.3. Negative HCG.   *Has previously seen Dr. Thomas of [facility], EEG and MRI brain ordered, however patient was unable to complete due to insurance issues.   *Has not previously taken anti-seizure medication   - Admit to inpatient given the need for  prolonged EEG  - Neurology consulted by the ED  - MRI brain with no acute intracranial pathology and no abnormal intracranial enhancement  - Prolonged EEG study per neurology.  Terminated at 2 PM on 2/20/2025.  Patient had seizure-like episode this morning while undergoing EEG with no epileptogenic activity seen.  -Felt that her episodes were likely psychogenic in nature.  Patient has an appointment with psychologist for cognitive behavioral therapy next month which would be the appropriate management for nonepileptic events.  If she needs further outpatient neurology follow-up then she should return to see Dr. Reema Buckley who she saw in October 2024.  No further workup is necessary at this time.  -EEG read pending.  - Maintain seizure precautions   - Fall precautions     Anxiety/Depression   PTSD  *Has established psychiatrist   - Continue PTA duloxetine     Prior ETOH abuse  *Sober for over 3 years now.   - Takes THC gummies for sleep, notes that these episodes do not occur around bedtime.      Clinically Significant Risk Factors                                  # Asthma: noted on problem list         Krys Mora MD, MD        Code Status   Full Code       Primary Care Physician   Physician No Ref-Primary    Physical Exam   Temp: 98.7  F (37.1  C) Temp src: Oral BP: 127/88 Pulse: 109   Resp: 18 SpO2: 99 % O2 Device: None (Room air)    There were no vitals filed for this visit.  Vital Signs with Ranges  Temp:  [98.3  F (36.8  C)-99.6  F (37.6  C)] 98.7  F (37.1  C)  Pulse:  [103-121] 109  Resp:  [16-18] 18  BP: (115-134)/(79-96) 127/88  SpO2:  [98 %-100 %] 99 %  No intake/output data recorded.    Physical Exam  Constitutional:       Appearance: Normal appearance.   Cardiovascular:      Rate and Rhythm: Normal rate and regular rhythm.      Pulses: Normal pulses.      Heart sounds: Normal heart sounds.   Pulmonary:      Effort: Pulmonary effort is normal. No respiratory distress.      Breath sounds: Normal  breath sounds.   Abdominal:      General: Abdomen is flat. Bowel sounds are normal. There is no distension.      Tenderness: There is no abdominal tenderness. There is no guarding.   Skin:     General: Skin is warm and dry.   Neurological:      General: No focal deficit present.           Discharge Disposition   Discharged to home  Condition at discharge: Stable    Consultations This Hospital Stay   NEUROLOGY IP CONSULT    Time Spent on this Encounter   Krys BERRY MD, personally saw the patient today and spent greater than 30 minutes discharging this patient.    Discharge Orders      Hospital to Primary Care - Establish PCP Referral      Reason for your hospital stay    Seizure like activity     Activity    Your activity upon discharge: activity as tolerated     Diet    Follow this diet upon discharge: Current Diet:Orders Placed This Encounter      Combination Diet Regular Diet Adult     Discharge Medications   Current Discharge Medication List        CONTINUE these medications which have NOT CHANGED    Details   ARIPiprazole (ABILIFY) 5 MG tablet Take 1 tablet by mouth daily.      DULoxetine (CYMBALTA) 60 MG capsule Take 120 mg by mouth daily.      levocetirizine (XYZAL) 5 MG tablet Take 5 mg by mouth every evening.      levonorgestrel-ethinyl estradiol (AVIANE) 0.1-20 MG-MCG tablet Take 1 tablet by mouth daily.  Qty: 84 tablet, Refills: 1    Associated Diagnoses: Encounter for surveillance of contraceptive pills           Allergies   Allergies   Allergen Reactions    Cefprozil Hives    Latex Rash     Data   Recent Labs   Lab Test 02/19/25  1041 12/17/24  1523 11/23/24  1538   WBC 6.0 8.5 10.2   HGB 12.3 12.9 12.4   MCV 86 85 85    406 367      Recent Labs   Lab Test 02/19/25  1041 12/17/24  1523 11/23/24  1538    137 140   POTASSIUM 5.1 4.2 3.6   CHLORIDE 104 101 106   CO2 26 24 20*   BUN 11.9 11.1 11.5   CR 0.61 0.69 0.76   ANIONGAP 10 12 14   DAVID 9.4 9.4 9.3   GLC 93 81 103*         Results  for orders placed or performed during the hospital encounter of 02/19/25   MR Brain w/o & w Contrast    Narrative    EXAM: MR BRAIN W/O and W CONTRAST  LOCATION: [facility]  DATE: 2/19/2025    INDICATION: recurrent episode of loc AOC transient neuro events r o seizure.  COMPARISON: 11/23/2024  CONTRAST: 6 mL Gadavist  TECHNIQUE: Routine multiplanar multisequence head MRI without and with intravenous contrast.    FINDINGS:  INTRACRANIAL CONTENTS: No acute or subacute infarct. No mass, acute hemorrhage, or extra-axial fluid collections. Normal brain parenchymal signal. Normal ventricles and sulci. Normal position of the cerebellar tonsils. No pathologic contrast enhancement.    SELLA: No abnormality accounting for technique.    OSSEOUS STRUCTURES/SOFT TISSUES: Normal marrow signal. The major intracranial vascular flow voids are maintained.     ORBITS: No abnormality accounting for technique.     SINUSES/MASTOIDS: No paranasal sinus mucosal disease. No middle ear or mastoid effusion.       Impression    IMPRESSION:    1.  No acute intracranial pathology.  2.  No abnormal intracranial enhancement.

## 2025-02-22 ENCOUNTER — PATIENT OUTREACH (OUTPATIENT)
Dept: CARE COORDINATION | Facility: CLINIC | Age: 30
End: 2025-02-22

## 2025-02-22 NOTE — PROGRESS NOTES
[facility]    Background: Transitional Care Management program identified per system criteria and reviewed by [facility] team for possible outreach.    Assessment: Upon chart review, [facility] Team member will not proceed with patient outreach related to this episode of Transitional Care Management program due to reason below:    Patient declined to answer all post hospital discharge questions with [facility] team member and disconnected call.    Plan: Transitional Care Management episode addressed appropriately per reason noted above.      LASHA Adams  [facility]    *Connected Care Resource Team does NOT follow patient ongoing. Referrals are identified based on internal discharge reports and the outreach is to ensure patient has an understanding of their discharge instructions.